# Patient Record
Sex: FEMALE | Race: WHITE | NOT HISPANIC OR LATINO | Employment: OTHER | URBAN - METROPOLITAN AREA
[De-identification: names, ages, dates, MRNs, and addresses within clinical notes are randomized per-mention and may not be internally consistent; named-entity substitution may affect disease eponyms.]

---

## 2017-11-05 ENCOUNTER — HOSPITAL ENCOUNTER (INPATIENT)
Facility: HOSPITAL | Age: 62
LOS: 2 days | Discharge: HOME | End: 2017-11-08
Attending: EMERGENCY MEDICINE | Admitting: INTERNAL MEDICINE

## 2017-11-05 DIAGNOSIS — K52.9 COLITIS: Primary | ICD-10-CM

## 2017-11-05 PROCEDURE — 99284 EMERGENCY DEPT VISIT MOD MDM: CPT

## 2017-11-05 RX ORDER — ONDANSETRON 2 MG/ML
4 INJECTION INTRAMUSCULAR; INTRAVENOUS ONCE
Status: COMPLETED | OUTPATIENT
Start: 2017-11-05 | End: 2017-11-06

## 2017-11-05 RX ORDER — CARVEDILOL 6.25 MG/1
6.25 TABLET ORAL 2 TIMES DAILY
COMMUNITY
End: 2021-11-29 | Stop reason: SDUPTHER

## 2017-11-05 RX ORDER — ASPIRIN 81 MG/1
81 TABLET, CHEWABLE ORAL DAILY
COMMUNITY

## 2017-11-05 RX ORDER — HYDROMORPHONE HYDROCHLORIDE 1 MG/ML
0.5 INJECTION, SOLUTION INTRAMUSCULAR; INTRAVENOUS; SUBCUTANEOUS ONCE
Status: COMPLETED | OUTPATIENT
Start: 2017-11-05 | End: 2017-11-06

## 2017-11-05 RX ORDER — LISINOPRIL AND HYDROCHLOROTHIAZIDE 20; 12.5 MG/1; MG/1
1 TABLET ORAL DAILY
COMMUNITY
End: 2021-11-29 | Stop reason: ALTCHOICE

## 2017-11-05 RX ORDER — CYCLOBENZAPRINE HCL 10 MG
10 TABLET ORAL 3 TIMES DAILY PRN
COMMUNITY

## 2017-11-06 ENCOUNTER — APPOINTMENT (OUTPATIENT)
Dept: CT IMAGING | Facility: HOSPITAL | Age: 62
End: 2017-11-06

## 2017-11-06 PROBLEM — K62.5 BRBPR (BRIGHT RED BLOOD PER RECTUM): Status: ACTIVE | Noted: 2017-11-06

## 2017-11-06 PROBLEM — K52.9 COLITIS: Status: ACTIVE | Noted: 2017-11-06

## 2017-11-06 PROBLEM — I10 HTN (HYPERTENSION): Status: ACTIVE | Noted: 2017-11-06

## 2017-11-06 PROBLEM — N17.9 AKI (ACUTE KIDNEY INJURY): Status: ACTIVE | Noted: 2017-11-06

## 2017-11-06 LAB
ALBUMIN SERPL-MCNC: 4.5 G/DL (ref 3.5–5.2)
ALBUMIN/GLOB SERPL: 1.7 G/DL
ALP SERPL-CCNC: 75 U/L (ref 39–117)
ALT SERPL W P-5'-P-CCNC: 21 U/L (ref 1–33)
ANION GAP SERPL CALCULATED.3IONS-SCNC: 16.2 MMOL/L
ANION GAP SERPL CALCULATED.3IONS-SCNC: 18.2 MMOL/L
AST SERPL-CCNC: 23 U/L (ref 1–32)
BACTERIA UR QL AUTO: ABNORMAL /HPF
BASOPHILS # BLD AUTO: 0.02 10*3/MM3 (ref 0–0.2)
BASOPHILS NFR BLD AUTO: 0.1 % (ref 0–1.5)
BILIRUB SERPL-MCNC: 0.6 MG/DL (ref 0.1–1.2)
BILIRUB UR QL STRIP: NEGATIVE
BUN BLD-MCNC: 25 MG/DL (ref 8–23)
BUN BLD-MCNC: 26 MG/DL (ref 8–23)
BUN/CREAT SERPL: 16.4 (ref 7–25)
BUN/CREAT SERPL: 19.4 (ref 7–25)
C DIFF TOX GENS STL QL NAA+PROBE: NEGATIVE
CALCIUM SPEC-SCNC: 8.3 MG/DL (ref 8.6–10.5)
CALCIUM SPEC-SCNC: 9.5 MG/DL (ref 8.6–10.5)
CHLORIDE SERPL-SCNC: 106 MMOL/L (ref 98–107)
CHLORIDE SERPL-SCNC: 99 MMOL/L (ref 98–107)
CLARITY UR: ABNORMAL
CO2 SERPL-SCNC: 18.8 MMOL/L (ref 22–29)
CO2 SERPL-SCNC: 23.8 MMOL/L (ref 22–29)
COLOR UR: ABNORMAL
CREAT BLD-MCNC: 1.29 MG/DL (ref 0.57–1)
CREAT BLD-MCNC: 1.59 MG/DL (ref 0.57–1)
D-LACTATE SERPL-SCNC: 1.2 MMOL/L (ref 0.5–2)
D-LACTATE SERPL-SCNC: 2.3 MMOL/L (ref 0.5–2)
D-LACTATE SERPL-SCNC: 2.4 MMOL/L (ref 0.5–2)
DEPRECATED RDW RBC AUTO: 45.5 FL (ref 37–54)
DEPRECATED RDW RBC AUTO: 46.1 FL (ref 37–54)
EOSINOPHIL # BLD AUTO: 0.01 10*3/MM3 (ref 0–0.7)
EOSINOPHIL NFR BLD AUTO: 0.1 % (ref 0.3–6.2)
ERYTHROCYTE [DISTWIDTH] IN BLOOD BY AUTOMATED COUNT: 13.2 % (ref 11.7–13)
ERYTHROCYTE [DISTWIDTH] IN BLOOD BY AUTOMATED COUNT: 13.3 % (ref 11.7–13)
GFR SERPL CREATININE-BSD FRML MDRD: 33 ML/MIN/1.73
GFR SERPL CREATININE-BSD FRML MDRD: 42 ML/MIN/1.73
GLOBULIN UR ELPH-MCNC: 2.7 GM/DL
GLUCOSE BLD-MCNC: 127 MG/DL (ref 65–99)
GLUCOSE BLD-MCNC: 188 MG/DL (ref 65–99)
GLUCOSE UR STRIP-MCNC: NEGATIVE MG/DL
GRAN CASTS URNS QL MICRO: ABNORMAL /LPF
HCT VFR BLD AUTO: 41.2 % (ref 35.6–45.5)
HCT VFR BLD AUTO: 44.4 % (ref 35.6–45.5)
HCT VFR BLD AUTO: 47.4 % (ref 35.6–45.5)
HGB BLD-MCNC: 13.3 G/DL (ref 11.9–15.5)
HGB BLD-MCNC: 14.3 G/DL (ref 11.9–15.5)
HGB BLD-MCNC: 15.8 G/DL (ref 11.9–15.5)
HGB UR QL STRIP.AUTO: NEGATIVE
HOLD SPECIMEN: NORMAL
HYALINE CASTS UR QL AUTO: ABNORMAL /LPF
IMM GRANULOCYTES # BLD: 0.05 10*3/MM3 (ref 0–0.03)
IMM GRANULOCYTES NFR BLD: 0.3 % (ref 0–0.5)
INR PPP: 1.01 (ref 0.9–1.1)
KETONES UR QL STRIP: ABNORMAL
LACTOFERRIN STL QL LA: POSITIVE
LEUKOCYTE ESTERASE UR QL STRIP.AUTO: ABNORMAL
LYMPHOCYTES # BLD AUTO: 1.66 10*3/MM3 (ref 0.9–4.8)
LYMPHOCYTES NFR BLD AUTO: 8.7 % (ref 19.6–45.3)
MCH RBC QN AUTO: 31 PG (ref 26.9–32)
MCH RBC QN AUTO: 31.3 PG (ref 26.9–32)
MCHC RBC AUTO-ENTMCNC: 32.2 G/DL (ref 32.4–36.3)
MCHC RBC AUTO-ENTMCNC: 33.3 G/DL (ref 32.4–36.3)
MCV RBC AUTO: 94 FL (ref 80.5–98.2)
MCV RBC AUTO: 96.3 FL (ref 80.5–98.2)
MONOCYTES # BLD AUTO: 1.23 10*3/MM3 (ref 0.2–1.2)
MONOCYTES NFR BLD AUTO: 6.4 % (ref 5–12)
NEUTROPHILS # BLD AUTO: 16.22 10*3/MM3 (ref 1.9–8.1)
NEUTROPHILS NFR BLD AUTO: 84.4 % (ref 42.7–76)
NITRITE UR QL STRIP: POSITIVE
PH UR STRIP.AUTO: <=5 [PH] (ref 5–8)
PLATELET # BLD AUTO: 175 10*3/MM3 (ref 140–500)
PLATELET # BLD AUTO: 268 10*3/MM3 (ref 140–500)
PMV BLD AUTO: 10 FL (ref 6–12)
PMV BLD AUTO: 10.3 FL (ref 6–12)
POTASSIUM BLD-SCNC: 3.8 MMOL/L (ref 3.5–5.2)
POTASSIUM BLD-SCNC: 3.9 MMOL/L (ref 3.5–5.2)
PROCALCITONIN SERPL-MCNC: 1.69 NG/ML (ref 0.1–0.25)
PROT SERPL-MCNC: 7.2 G/DL (ref 6–8.5)
PROT UR QL STRIP: ABNORMAL
PROTHROMBIN TIME: 12.8 SECONDS (ref 11.7–14.2)
RBC # BLD AUTO: 4.61 10*6/MM3 (ref 3.9–5.2)
RBC # BLD AUTO: 5.04 10*6/MM3 (ref 3.9–5.2)
RBC # UR: ABNORMAL /HPF
REF LAB TEST METHOD: ABNORMAL
SODIUM BLD-SCNC: 141 MMOL/L (ref 136–145)
SODIUM BLD-SCNC: 141 MMOL/L (ref 136–145)
SP GR UR STRIP: 1.02 (ref 1–1.03)
SQUAMOUS #/AREA URNS HPF: ABNORMAL /HPF
UROBILINOGEN UR QL STRIP: ABNORMAL
WBC NRBC COR # BLD: 14.21 10*3/MM3 (ref 4.5–10.7)
WBC NRBC COR # BLD: 19.19 10*3/MM3 (ref 4.5–10.7)
WBC UR QL AUTO: ABNORMAL /HPF

## 2017-11-06 PROCEDURE — 80053 COMPREHEN METABOLIC PANEL: CPT | Performed by: EMERGENCY MEDICINE

## 2017-11-06 PROCEDURE — 87046 STOOL CULTR AEROBIC BACT EA: CPT | Performed by: HOSPITALIST

## 2017-11-06 PROCEDURE — 25010000002 HYDROMORPHONE PER 4 MG: Performed by: HOSPITALIST

## 2017-11-06 PROCEDURE — 83605 ASSAY OF LACTIC ACID: CPT | Performed by: EMERGENCY MEDICINE

## 2017-11-06 PROCEDURE — 83631 LACTOFERRIN FECAL (QUANT): CPT | Performed by: INTERNAL MEDICINE

## 2017-11-06 PROCEDURE — 87045 FECES CULTURE AEROBIC BACT: CPT | Performed by: HOSPITALIST

## 2017-11-06 PROCEDURE — 74176 CT ABD & PELVIS W/O CONTRAST: CPT

## 2017-11-06 PROCEDURE — 85014 HEMATOCRIT: CPT | Performed by: INTERNAL MEDICINE

## 2017-11-06 PROCEDURE — 25010000002 HYDROMORPHONE PER 4 MG: Performed by: EMERGENCY MEDICINE

## 2017-11-06 PROCEDURE — 83605 ASSAY OF LACTIC ACID: CPT | Performed by: INTERNAL MEDICINE

## 2017-11-06 PROCEDURE — 85018 HEMOGLOBIN: CPT | Performed by: INTERNAL MEDICINE

## 2017-11-06 PROCEDURE — 25010000002 CEFTRIAXONE PER 250 MG: Performed by: EMERGENCY MEDICINE

## 2017-11-06 PROCEDURE — 81001 URINALYSIS AUTO W/SCOPE: CPT | Performed by: EMERGENCY MEDICINE

## 2017-11-06 PROCEDURE — 85027 COMPLETE CBC AUTOMATED: CPT | Performed by: HOSPITALIST

## 2017-11-06 PROCEDURE — 85610 PROTHROMBIN TIME: CPT | Performed by: EMERGENCY MEDICINE

## 2017-11-06 PROCEDURE — 87086 URINE CULTURE/COLONY COUNT: CPT | Performed by: EMERGENCY MEDICINE

## 2017-11-06 PROCEDURE — 25010000002 ONDANSETRON PER 1 MG: Performed by: EMERGENCY MEDICINE

## 2017-11-06 PROCEDURE — 25010000002 ONDANSETRON PER 1 MG: Performed by: HOSPITALIST

## 2017-11-06 PROCEDURE — 85025 COMPLETE CBC W/AUTO DIFF WBC: CPT | Performed by: EMERGENCY MEDICINE

## 2017-11-06 PROCEDURE — 84145 PROCALCITONIN (PCT): CPT | Performed by: EMERGENCY MEDICINE

## 2017-11-06 PROCEDURE — 87493 C DIFF AMPLIFIED PROBE: CPT | Performed by: INTERNAL MEDICINE

## 2017-11-06 RX ORDER — HYDROMORPHONE HYDROCHLORIDE 1 MG/ML
0.5 INJECTION, SOLUTION INTRAMUSCULAR; INTRAVENOUS; SUBCUTANEOUS
Status: DISCONTINUED | OUTPATIENT
Start: 2017-11-06 | End: 2017-11-08 | Stop reason: HOSPADM

## 2017-11-06 RX ORDER — DEXTROSE AND SODIUM CHLORIDE 5; .45 G/100ML; G/100ML
100 INJECTION, SOLUTION INTRAVENOUS CONTINUOUS
Status: DISCONTINUED | OUTPATIENT
Start: 2017-11-06 | End: 2017-11-06

## 2017-11-06 RX ORDER — SODIUM CHLORIDE 9 MG/ML
75 INJECTION, SOLUTION INTRAVENOUS CONTINUOUS
Status: DISCONTINUED | OUTPATIENT
Start: 2017-11-06 | End: 2017-11-06

## 2017-11-06 RX ORDER — CEFTRIAXONE SODIUM 1 G/50ML
1 INJECTION, SOLUTION INTRAVENOUS ONCE
Status: COMPLETED | OUTPATIENT
Start: 2017-11-06 | End: 2017-11-06

## 2017-11-06 RX ORDER — CARVEDILOL 6.25 MG/1
6.25 TABLET ORAL 2 TIMES DAILY
Status: DISCONTINUED | OUTPATIENT
Start: 2017-11-06 | End: 2017-11-08 | Stop reason: HOSPADM

## 2017-11-06 RX ORDER — ACETAMINOPHEN 325 MG/1
650 TABLET ORAL EVERY 4 HOURS PRN
Status: DISCONTINUED | OUTPATIENT
Start: 2017-11-06 | End: 2017-11-08 | Stop reason: HOSPADM

## 2017-11-06 RX ORDER — LEVOFLOXACIN 5 MG/ML
500 INJECTION, SOLUTION INTRAVENOUS EVERY 24 HOURS
Status: DISCONTINUED | OUTPATIENT
Start: 2017-11-07 | End: 2017-11-07 | Stop reason: CLARIF

## 2017-11-06 RX ORDER — DICYCLOMINE HYDROCHLORIDE 10 MG/1
10 CAPSULE ORAL 4 TIMES DAILY
Status: DISCONTINUED | OUTPATIENT
Start: 2017-11-06 | End: 2017-11-08 | Stop reason: HOSPADM

## 2017-11-06 RX ORDER — ONDANSETRON 2 MG/ML
4 INJECTION INTRAMUSCULAR; INTRAVENOUS EVERY 6 HOURS PRN
Status: DISCONTINUED | OUTPATIENT
Start: 2017-11-06 | End: 2017-11-08 | Stop reason: HOSPADM

## 2017-11-06 RX ORDER — CYCLOBENZAPRINE HCL 10 MG
10 TABLET ORAL 3 TIMES DAILY PRN
Status: DISCONTINUED | OUTPATIENT
Start: 2017-11-06 | End: 2017-11-08 | Stop reason: HOSPADM

## 2017-11-06 RX ORDER — NALOXONE HCL 0.4 MG/ML
0.4 VIAL (ML) INJECTION
Status: DISCONTINUED | OUTPATIENT
Start: 2017-11-06 | End: 2017-11-08 | Stop reason: HOSPADM

## 2017-11-06 RX ORDER — SODIUM CHLORIDE, SODIUM LACTATE, POTASSIUM CHLORIDE, CALCIUM CHLORIDE 600; 310; 30; 20 MG/100ML; MG/100ML; MG/100ML; MG/100ML
20 INJECTION, SOLUTION INTRAVENOUS CONTINUOUS
Status: DISCONTINUED | OUTPATIENT
Start: 2017-11-06 | End: 2017-11-08 | Stop reason: HOSPADM

## 2017-11-06 RX ORDER — SACCHAROMYCES BOULARDII 250 MG
250 CAPSULE ORAL 2 TIMES DAILY
Status: DISCONTINUED | OUTPATIENT
Start: 2017-11-06 | End: 2017-11-08 | Stop reason: HOSPADM

## 2017-11-06 RX ORDER — ASPIRIN 81 MG/1
81 TABLET, CHEWABLE ORAL DAILY
Status: DISCONTINUED | OUTPATIENT
Start: 2017-11-06 | End: 2017-11-06

## 2017-11-06 RX ORDER — NITROGLYCERIN 0.4 MG/1
0.4 TABLET SUBLINGUAL
Status: DISCONTINUED | OUTPATIENT
Start: 2017-11-06 | End: 2017-11-08 | Stop reason: HOSPADM

## 2017-11-06 RX ORDER — HYDROMORPHONE HYDROCHLORIDE 1 MG/ML
0.5 INJECTION, SOLUTION INTRAMUSCULAR; INTRAVENOUS; SUBCUTANEOUS ONCE
Status: COMPLETED | OUTPATIENT
Start: 2017-11-06 | End: 2017-11-06

## 2017-11-06 RX ADMIN — SODIUM CHLORIDE 1000 ML: 9 INJECTION, SOLUTION INTRAVENOUS at 00:09

## 2017-11-06 RX ADMIN — CARVEDILOL 6.25 MG: 6.25 TABLET, FILM COATED ORAL at 18:11

## 2017-11-06 RX ADMIN — DEXTROSE AND SODIUM CHLORIDE 100 ML/HR: 5; 450 INJECTION, SOLUTION INTRAVENOUS at 05:07

## 2017-11-06 RX ADMIN — DICYCLOMINE HYDROCHLORIDE 10 MG: 10 CAPSULE ORAL at 13:38

## 2017-11-06 RX ADMIN — CARVEDILOL 6.25 MG: 6.25 TABLET, FILM COATED ORAL at 08:58

## 2017-11-06 RX ADMIN — ACETAMINOPHEN 650 MG: 325 TABLET ORAL at 23:12

## 2017-11-06 RX ADMIN — HYDROMORPHONE HYDROCHLORIDE 0.5 MG: 1 INJECTION, SOLUTION INTRAMUSCULAR; INTRAVENOUS; SUBCUTANEOUS at 05:07

## 2017-11-06 RX ADMIN — CEFTRIAXONE SODIUM 1 G: 1 INJECTION, SOLUTION INTRAVENOUS at 01:15

## 2017-11-06 RX ADMIN — CYCLOBENZAPRINE HYDROCHLORIDE 10 MG: 10 TABLET, FILM COATED ORAL at 18:18

## 2017-11-06 RX ADMIN — Medication 250 MG: at 12:54

## 2017-11-06 RX ADMIN — DICYCLOMINE HYDROCHLORIDE 10 MG: 10 CAPSULE ORAL at 18:11

## 2017-11-06 RX ADMIN — HYDROMORPHONE HYDROCHLORIDE 0.5 MG: 1 INJECTION, SOLUTION INTRAMUSCULAR; INTRAVENOUS; SUBCUTANEOUS at 00:09

## 2017-11-06 RX ADMIN — Medication 250 MG: at 18:11

## 2017-11-06 RX ADMIN — HYDROMORPHONE HYDROCHLORIDE 0.5 MG: 1 INJECTION, SOLUTION INTRAMUSCULAR; INTRAVENOUS; SUBCUTANEOUS at 01:53

## 2017-11-06 RX ADMIN — HYDROMORPHONE HYDROCHLORIDE 0.5 MG: 1 INJECTION, SOLUTION INTRAMUSCULAR; INTRAVENOUS; SUBCUTANEOUS at 08:58

## 2017-11-06 RX ADMIN — METRONIDAZOLE 500 MG: 500 INJECTION, SOLUTION INTRAVENOUS at 18:11

## 2017-11-06 RX ADMIN — HYDROMORPHONE HYDROCHLORIDE 0.5 MG: 1 INJECTION, SOLUTION INTRAMUSCULAR; INTRAVENOUS; SUBCUTANEOUS at 10:59

## 2017-11-06 RX ADMIN — SODIUM CHLORIDE, POTASSIUM CHLORIDE, SODIUM LACTATE AND CALCIUM CHLORIDE 1000 ML: 600; 310; 30; 20 INJECTION, SOLUTION INTRAVENOUS at 08:40

## 2017-11-06 RX ADMIN — METRONIDAZOLE 500 MG: 500 INJECTION, SOLUTION INTRAVENOUS at 10:45

## 2017-11-06 RX ADMIN — SODIUM CHLORIDE 2259 ML: 9 INJECTION, SOLUTION INTRAVENOUS at 01:18

## 2017-11-06 RX ADMIN — ONDANSETRON 4 MG: 2 INJECTION INTRAMUSCULAR; INTRAVENOUS at 00:09

## 2017-11-06 RX ADMIN — ONDANSETRON 4 MG: 2 INJECTION INTRAMUSCULAR; INTRAVENOUS at 10:59

## 2017-11-06 RX ADMIN — DICYCLOMINE HYDROCHLORIDE 10 MG: 10 CAPSULE ORAL at 10:58

## 2017-11-06 RX ADMIN — SODIUM CHLORIDE, POTASSIUM CHLORIDE, SODIUM LACTATE AND CALCIUM CHLORIDE 100 ML/HR: 600; 310; 30; 20 INJECTION, SOLUTION INTRAVENOUS at 08:40

## 2017-11-06 RX ADMIN — METRONIDAZOLE 500 MG: 500 INJECTION, SOLUTION INTRAVENOUS at 02:03

## 2017-11-06 RX ADMIN — SODIUM CHLORIDE, POTASSIUM CHLORIDE, SODIUM LACTATE AND CALCIUM CHLORIDE 100 ML/HR: 600; 310; 30; 20 INJECTION, SOLUTION INTRAVENOUS at 22:00

## 2017-11-06 RX ADMIN — SODIUM CHLORIDE 75 ML/HR: 9 INJECTION, SOLUTION INTRAVENOUS at 04:35

## 2017-11-06 RX ADMIN — CYCLOBENZAPRINE HYDROCHLORIDE 10 MG: 10 TABLET, FILM COATED ORAL at 05:07

## 2017-11-06 NOTE — CONSULTS
Inpatient Consult to Gastroenterology  Consult performed by: THOMPSON CEBALLOS  Consult ordered by: JONATHAN ROSSI  Reason for consult: diarrhea, bleeding, colitis          Patient Care Team:  Farzaneh Ball MD as PCP - General (Internal Medicine)    Chief complaint: nausea, vomiting, diarrhea, blood in stool    Subjective   Pt is a 62 y.o. female who presents due to an episode of bloody diarrhea that started last night. Pt also complains of abdominal pain that she rates as an 8/10 in severity, nausea, and vomiting. The abdominal pain, diarrhea, and rectal bleeding onset simultaneously. Has had cramping in the past but states it has never been this bad. Had a colonoscopy done at [facility] when she was 48 y.o. Believes she did have polyps that were removed at that time but unsure if diverticulosis was noted. Patient is also positive for UTI.         .Abdominal Pain   This is a new problem. The current episode started yesterday. The onset quality is sudden. The pain is moderate. The quality of the pain is cramping. The abdominal pain radiates to the LLQ and RLQ. Associated symptoms include diarrhea, hematochezia, hematuria, nausea and vomiting.   Rectal Bleeding   Associated symptoms include abdominal pain, nausea and vomiting.       Review of Systems   Gastrointestinal: Positive for abdominal pain, blood in stool, diarrhea, hematochezia, nausea and vomiting.   Genitourinary: Positive for hematuria.        Past Medical History:   Diagnosis Date   • Coronary artery disease    • Depression    • Hyperlipidemia    • Hypertension    • Myocardial infarction    ,   Past Surgical History:   Procedure Laterality Date   • HYSTERECTOMY     • NEPHRECTOMY Left    , History reviewed. No pertinent family history.,   Social History   Substance Use Topics   • Smoking status: Current Every Day Smoker     Packs/day: 0.50     Types: Cigarettes   • Smokeless tobacco: None   • Alcohol use No   ,   Prescriptions Prior to  Admission   Medication Sig Dispense Refill Last Dose   • aspirin 81 MG chewable tablet Chew 81 mg Daily.   11/5/2017 at Unknown time   • Carvedilol (COREG PO) Take 6.25 mg by mouth 2 (Two) Times a Day.   11/5/2017 at Unknown time   • cyclobenzaprine (FLEXERIL) 10 MG tablet Take 10 mg by mouth 3 (Three) Times a Day As Needed for Muscle Spasms.   Past Week at Unknown time   • LISINOPRIL PO Take  by mouth Daily.   11/5/2017 at Unknown time   • lisinopril-hydrochlorothiazide (PRINZIDE,ZESTORETIC) 20-12.5 MG per tablet Take 1 tablet by mouth Daily.   11/5/2017 at Unknown time   , Scheduled Meds:    carvedilol 6.25 mg Oral BID   dicyclomine 10 mg Oral 4x Daily   [START ON 11/7/2017] levoFLOXacin 500 mg Intravenous Q24H   metroNIDAZOLE 500 mg Intravenous Q8H   saccharomyces boulardii 250 mg Oral BID   , Continuous Infusions:    lactated ringers 100 mL/hr Last Rate: 100 mL/hr (11/06/17 0840)   , PRN Meds:  cyclobenzaprine  •  HYDROmorphone **AND** naloxone  •  nitroglycerin  •  ondansetron and Allergies:  Penicillins    Objective      Vital Signs  Temp:  [97.6 °F (36.4 °C)-99 °F (37.2 °C)] 98.7 °F (37.1 °C)  Heart Rate:  [] 101  Resp:  [15-18] 16  BP: (142-157)/() 149/90    Physical Exam   Constitutional: She is oriented to person, place, and time. She appears well-developed and well-nourished.   Cardiovascular: Regular rhythm and normal heart sounds.  Exam reveals no gallop and no friction rub.    No murmur heard.  tachycardia   Pulmonary/Chest: Effort normal and breath sounds normal. She has no wheezes. She has no rales.   Abdominal: Soft. Bowel sounds are normal. She exhibits no distension and no mass. There is tenderness. There is no rebound and no guarding. No hernia.   Neurological: She is alert and oriented to person, place, and time.   Skin: Skin is warm and dry.   Psychiatric: She has a normal mood and affect. Her behavior is normal. Judgment and thought content normal.       Results Review:    I  reviewed the patient's new clinical results.        Assessment/Plan     Principal Problem:    Colitis  Active Problems:    BRBPR (bright red blood per rectum)    HTN (hypertension)    HANSA (acute kidney injury)      Assessment:  (Acute Colitis).     Plan:   (After discussing with patient and Dr. Miller do not feel it is necessary to perform colonoscopy at this time. Once discharged and patient is feeling better will do a scope then, unless symptoms become worse. Patient will be placed on a full liquid diet. Ordered Florastore. Awaiting stool culture results. Continue current plain of care. ).       I discussed the patients findings and my recommendations with patient and nursing staff    Jing Patel, APRN  11/06/17  10:44 AM    Time: Critical care 15 min

## 2017-11-06 NOTE — H&P
Patient Identification:  Name: Alicia Ag  Age/Sex: 62 y.o. female  :  1955  MRN: [id number]         Primary Care Physician: Farzaneh Ball MD    Chief Complaint   Patient presents with   • Abdominal Pain     onset this afternoon   • Rectal Bleeding     onset 30 min PTA       Subjective   Patient is a 62 y.o. female with a h/o HTN, CAD who presents with several hour history of abdominal cramping and bloody diarrhea. She states it started yesterday around 4p with diffuse abdominal cramping. She then had diarrhea. She said the first 2-3 episodes were brown but then they turned to cathy blood. Had some nausea and felt like throwing up but never did. She denies eating anything unusual or undercooked prior to this. She denies any sick contacts or recent abx use. She had some chills but no fevers that she knows of. Nothing made it better or worse. Never had anything liek this before. Has had multiple episodes of bloody diarrhea since this started.     History of Present Illness    Past Medical History:   Diagnosis Date   • Coronary artery disease    • Depression    • Hyperlipidemia    • Hypertension    • Myocardial infarction      Past Surgical History:   Procedure Laterality Date   • HYSTERECTOMY     • NEPHRECTOMY Left      History reviewed. No pertinent family history.  Social History   Substance Use Topics   • Smoking status: Current Every Day Smoker     Packs/day: 0.50     Types: Cigarettes   • Smokeless tobacco: None   • Alcohol use No     Prescriptions Prior to Admission   Medication Sig Dispense Refill Last Dose   • aspirin 81 MG chewable tablet Chew 81 mg Daily.   2017 at Unknown time   • Carvedilol (COREG PO) Take 6.25 mg by mouth 2 (Two) Times a Day.   2017 at Unknown time   • cyclobenzaprine (FLEXERIL) 10 MG tablet Take 10 mg by mouth 3 (Three) Times a Day As Needed for Muscle Spasms.   Past Week at Unknown time   • LISINOPRIL PO Take  by mouth Daily.   2017 at Unknown time   •  lisinopril-hydrochlorothiazide (PRINZIDE,ZESTORETIC) 20-12.5 MG per tablet Take 1 tablet by mouth Daily.   11/5/2017 at Unknown time     Allergies:  Penicillins    Review of Systems   Constitutional: Positive for chills. Negative for fever.   HENT: Negative.    Eyes: Negative.    Respiratory: Negative.    Cardiovascular: Negative.    Gastrointestinal: Positive for abdominal pain, blood in stool, diarrhea and nausea. Negative for vomiting.   Endocrine: Negative.    Genitourinary: Negative.    Musculoskeletal: Negative.    Skin: Negative.    Neurological: Negative.    Hematological: Negative.    Psychiatric/Behavioral: Negative.         Objective    Vital Signs  Temp:  [97.6 °F (36.4 °C)-99 °F (37.2 °C)] 98.7 °F (37.1 °C)  Heart Rate:  [] 110  Resp:  [15-18] 16  BP: (142-157)/() 149/90  Body mass index is 29.72 kg/(m^2).    Physical Exam   Constitutional: She is oriented to person, place, and time. She appears well-developed and well-nourished.   HENT:   Head: Normocephalic and atraumatic.   Mouth/Throat: No oropharyngeal exudate.   Eyes: Conjunctivae are normal. No scleral icterus.   Neck: Normal range of motion. No JVD present. No tracheal deviation present.   Cardiovascular: Regular rhythm.    No murmur heard.  tachy   Pulmonary/Chest: Effort normal and breath sounds normal.   Abdominal: Soft. Bowel sounds are normal. She exhibits no distension. There is tenderness. There is no rebound and no guarding.   Diffuse ttp   Musculoskeletal: She exhibits no edema or deformity.   Neurological: She is alert and oriented to person, place, and time.   Skin: Skin is warm and dry.   Psychiatric: She has a normal mood and affect. Her behavior is normal. Judgment and thought content normal.       Results Review:   I reviewed the patient's new clinical results.  Imaging Results (last 24 hours)     Procedure Component Value Units Date/Time    CT Abdomen Pelvis Without Contrast [[id number]] Collected:  11/06/17 0123      Updated:  11/06/17 0123    Narrative:       CT ABDOMEN AND PELVIS WITHOUT CONTRAST.     TECHNIQUE: Radiation dose reduction techniques were utilized, including  automated exposure control and exposure modulation based on body size. A  routine CT scan of the abdomen and pelvis was performed with coronal and  sagittal reconstructed images.     HISTORY: Left lower quadrant pain, rectal bleeding.     COMPARISON: No prior studies for comparison.     FINDINGS:  Lung bases demonstrate no consolidation or effusion.          Liver demonstrates homogeneous parenchyma, no definite mass. There are  multiple hepatic cysts, largest measures 3.4 cm. Unremarkable  gallbladder. No intra or extrahepatic biliary ductal dilatation.      The spleen is unremarkable. Pancreas is unremarkable, no pancreatic  ductal dilatation. Adrenal glands are within normal limits.     Kidneys do not demonstrate hydronephrosis. No definite renal calculi.  Urinary bladder is unremarkable.         There are mild inflammatory changes of the ascending and transverse  colon, more severe changes are seen in the descending and sigmoid colon,  the splenic flexure of the colon, descending and sigmoid colon is also  filled with fluid. Inflammatory changes are seen in the left paracolic  gutter and in the pelvis. Appendix is not visualized.     No significant retroperitoneal lymphadenopathy.              Impression:       Moderately severe colitis of the splenic flexure of the colon,  descending and sigmoid colon. Mild colitis of the ascending and  transverse colon. No obstruction, perforation or abscess. Differential  diagnoses includes infectious, inflammatory changes or ischemia in the  right clinical setting. Please clinically correlate.                      Assessment/Plan     Principal Problem:    Colitis  Active Problems:    BRBPR (bright red blood per rectum)    HTN (hypertension)      Assessment & Plan  1. Colitis with BRBPR  - sepsis present on  admission  - infectious vs inflammatory vs ischemic  - cont falgyl and levaquin  - stool culture, CDiff, fecal lactoferrin  - GI consult as she will need cscope at some point  - bolus another 1L LR and switch fluid to LR at 100cc/hr  - recheck lactate and H/H  - recheck procalcitonin in am  - add bentyl for abd cramping  - NPO until GI sees her    2. HANSA  - improving with IVF  - recheck in am    3. HTN  - restarted coreg  - holding lisinopril-HCTZ currently      I discussed the patients findings and my recommendations with patient.          Leeroy Hanson MD  [facility]  11/06/17  8:20 AM

## 2017-11-06 NOTE — PLAN OF CARE
Problem: Patient Care Overview (Adult)  Goal: Plan of Care Review  Outcome: Ongoing (interventions implemented as appropriate)    11/06/17 1702   Coping/Psychosocial Response Interventions   Plan Of Care Reviewed With patient;daughter   Patient Care Overview   Progress improving   Outcome Evaluation   Outcome Summary/Follow up Plan Pain control ongoing, tollorating PO, ABX ongoing       Goal: Adult Individualization and Mutuality  Outcome: Ongoing (interventions implemented as appropriate)  Goal: Discharge Needs Assessment  Outcome: Ongoing (interventions implemented as appropriate)    Problem: Sepsis (Adult)  Goal: Signs and Symptoms of Listed Potential Problems Will be Absent or Manageable (Sepsis)  Outcome: Ongoing (interventions implemented as appropriate)

## 2017-11-06 NOTE — ED TRIAGE NOTES
Pt c/o lower abdominal cramping, n/v and rectal bleeding. Cramping started this afternoon, rectal bleeding started 30 min PTA

## 2017-11-06 NOTE — ED PROVIDER NOTES
EMERGENCY DEPARTMENT ENCOUNTER    CHIEF COMPLAINT  Chief Complaint: rectal bleeding   History given by: patient  History limited by: nothing   Room Number: 32/32  PMD: Farzaneh Ball MD      HPI:  Pt is a 62 y.o. female who presents due to an episode of bloody diarrhea that occurred PTA. Pt also complains of abdominal pain that she rates as an 8/10 in severity, nausea, and vomiting. The abdominal pain and rectal bleeding onset simultaneously. Pt describes the blood as bright red. Pt denies h/o diverticulosis.     Onset: 30 minutes PTA  Timing: episode   Location: abdomen  Radiation: does not radiate   Quality: bright red blood  Intensity/Severity: moderate   Progression: unchanged   Associated Symptoms: nausea, vomiting, abdominal pain  Aggravating Factors: none specified   Alleviating Factors: none specified  Previous Episodes: none specified   Treatment before arrival: none specified     PAST MEDICAL HISTORY  Active Ambulatory Problems     Diagnosis Date Noted   • No Active Ambulatory Problems     Resolved Ambulatory Problems     Diagnosis Date Noted   • No Resolved Ambulatory Problems     Past Medical History:   Diagnosis Date   • Coronary artery disease    • Depression    • Hyperlipidemia    • Hypertension    • Myocardial infarction        PAST SURGICAL HISTORY  Past Surgical History:   Procedure Laterality Date   • HYSTERECTOMY     • NEPHRECTOMY Left        FAMILY HISTORY  History reviewed. No pertinent family history.    SOCIAL HISTORY  Social History     Social History   • Marital status:      Spouse name: N/A   • Number of children: N/A   • Years of education: N/A     Occupational History   • Not on file.     Social History Main Topics   • Smoking status: Current Every Day Smoker     Packs/day: 0.50     Types: Cigarettes   • Smokeless tobacco: Not on file   • Alcohol use No   • Drug use: No   • Sexual activity: Defer     Other Topics Concern   • Not on file     Social History Narrative   • No  narrative on file       ALLERGIES  Penicillins    REVIEW OF SYSTEMS  Review of Systems   Constitutional: Negative for fever.   Respiratory: Negative for shortness of breath.    Cardiovascular: Negative for chest pain.   Gastrointestinal: Positive for abdominal pain, blood in stool, diarrhea, nausea and vomiting.       PHYSICAL EXAM  ED Triage Vitals   Temp Heart Rate Resp BP SpO2   11/05/17 2243 11/05/17 2243 11/05/17 2243 11/05/17 2246 11/05/17 2243   97.6 °F (36.4 °C) 103 18 154/109 98 %      Temp src Heart Rate Source Patient Position BP Location FiO2 (%)   -- -- -- -- --              Physical Exam   Constitutional: She is oriented to person, place, and time and well-developed, well-nourished, and in no distress.   HENT:   Head: Normocephalic and atraumatic.   Eyes: EOM are normal. Pupils are equal, round, and reactive to light.   Neck: Normal range of motion. Neck supple.   Cardiovascular: Normal rate, regular rhythm and normal heart sounds.    Pulmonary/Chest: Effort normal and breath sounds normal. No respiratory distress.   Abdominal: Soft. There is tenderness (severe) in the left lower quadrant. There is guarding (voluntary). There is no rebound.   Musculoskeletal: Normal range of motion. She exhibits no edema.   Neurological: She is alert and oriented to person, place, and time. She has normal sensation and normal strength.   Skin: Skin is warm and dry. No rash noted.   Psychiatric: Mood and affect normal.   Nursing note and vitals reviewed.      LAB RESULTS  Lab Results (last 24 hours)     Procedure Component Value Units Date/Time    CBC & Differential [[id number]] Collected:  11/06/17 0008    Specimen:  Blood Updated:  11/06/17 0041    Narrative:       The following orders were created for panel order CBC & Differential.  Procedure                               Abnormality         Status                     ---------                               -----------         ------                     CBC Auto  "Differential[[id number]]        Abnormal            Final result                 Please view results for these tests on the individual orders.    Comprehensive Metabolic Panel [[id number]]  (Abnormal) Collected:  11/06/17 0008    Specimen:  Blood Updated:  11/06/17 0054     Glucose 188 (H) mg/dL      BUN 26 (H) mg/dL      Creatinine 1.59 (H) mg/dL      Sodium 141 mmol/L      Potassium 3.8 mmol/L      Chloride 99 mmol/L      CO2 23.8 mmol/L      Calcium 9.5 mg/dL      Total Protein 7.2 g/dL      Albumin 4.50 g/dL      ALT (SGPT) 21 U/L      AST (SGOT) 23 U/L      Alkaline Phosphatase 75 U/L      Total Bilirubin 0.6 mg/dL      eGFR Non African Amer 33 (L) mL/min/1.73      Globulin 2.7 gm/dL      A/G Ratio 1.7 g/dL      BUN/Creatinine Ratio 16.4     Anion Gap 18.2 mmol/L     Protime-INR [[id number]]  (Normal) Collected:  11/06/17 0008    Specimen:  Blood Updated:  11/06/17 0052     Protime 12.8 Seconds      INR 1.01    Lactic Acid, Plasma [[id number]]  (Abnormal) Collected:  11/06/17 0008    Specimen:  Blood Updated:  11/06/17 0056     Lactate 2.4 (C) mmol/L     Procalcitonin [[id number]]  (Abnormal) Collected:  11/06/17 0008    Specimen:  Blood Updated:  11/06/17 0110     Procalcitonin 1.69 (C) ng/mL     Narrative:       As a Marker for Sepsis (Non-Neonates):   1. <0.5 ng/mL represents a low risk of severe sepsis and/or septic shock.  1. >2 ng/mL represents a high risk of severe sepsis and/or septic shock.    As a Marker for Lower Respiratory Tract Infections that require antibiotic therapy:  PCT on Admission     Antibiotic Therapy             6-12 Hrs later  > 0.5                Strongly Recommended            >0.25 - <0.5         Recommended  0.1 - 0.25           Discouraged                   Remeasure/reassess PCT  <0.1                 Strongly Discouraged          Remeasure/reassess PCT      As 28 day mortality risk marker: \"Change in Procalcitonin Result\" (> 80 % or <=80 %) if Day 0 (or Day 1) and Day 4 values are " available. Refer to http://www.[facility]-pct-calculator.com/   Change in PCT <=80 %   A decrease of PCT levels below or equal to 80 % defines a positive change in PCT test result representing a higher risk for 28-day all-cause mortality of patients diagnosed with severe sepsis or septic shock.  Change in PCT > 80 %   A decrease of PCT levels of more than 80 % defines a negative change in PCT result representing a lower risk for 28-day all-cause mortality of patients diagnosed with severe sepsis or septic shock.                CBC Auto Differential [[id number]]  (Abnormal) Collected:  11/06/17 0008    Specimen:  Blood Updated:  11/06/17 0041     WBC 19.19 (H) 10*3/mm3      RBC 5.04 10*6/mm3      Hemoglobin 15.8 (H) g/dL      Hematocrit 47.4 (H) %      MCV 94.0 fL      MCH 31.3 pg      MCHC 33.3 g/dL      RDW 13.3 (H) %      RDW-SD 45.5 fl      MPV 10.3 fL      Platelets 268 10*3/mm3      Neutrophil % 84.4 (H) %      Lymphocyte % 8.7 (L) %      Monocyte % 6.4 %      Eosinophil % 0.1 (L) %      Basophil % 0.1 %      Immature Grans % 0.3 %      Neutrophils, Absolute 16.22 (H) 10*3/mm3      Lymphocytes, Absolute 1.66 10*3/mm3      Monocytes, Absolute 1.23 (H) 10*3/mm3      Eosinophils, Absolute 0.01 10*3/mm3      Basophils, Absolute 0.02 10*3/mm3      Immature Grans, Absolute 0.05 (H) 10*3/mm3     Lactic Acid, Reflex Timer [[id number]] Collected:  11/06/17 0008    Specimen:  Blood Updated:  11/06/17 0056    Urinalysis With / Culture If Indicated - Urine, Catheter [[id number]]  (Abnormal) Collected:  11/06/17 0017    Specimen:  Urine from Urine, Catheter Updated:  11/06/17 0043     Color, UA Violeta (A)     Appearance, UA Cloudy (A)     pH, UA <=5.0     Specific Gravity, UA 1.023     Glucose, UA Negative     Ketones, UA 15 mg/dL (1+) (A)     Bilirubin, UA Negative     Blood, UA Negative     Protein, UA 30 mg/dL (1+) (A)     Leuk Esterase, UA Moderate (2+) (A)     Nitrite, UA Positive (A)     Urobilinogen, UA 2.0 E.U./dL (A)     Urinalysis, Microscopic Only - Urine, Clean Catch [[id number]]  (Abnormal) Collected:  11/06/17 0017    Specimen:  Urine from Urine, Catheter Updated:  11/06/17 0056     RBC, UA None Seen /HPF      WBC, UA 3-5 (A) /HPF      Bacteria, UA 2+ (A) /HPF      Squamous Epithelial Cells, UA 3-6 (A) /HPF      Hyaline Casts, UA 7-12 /LPF      Granular Casts, UA 0-2 /LPF      Methodology Manual Light Microscopy    Urine Culture - Urine, Urine, Clean Catch [[id number]] Collected:  11/06/17 0017    Specimen:  Urine from Urine, Catheter Updated:  11/06/17 0036          I ordered the above labs and reviewed the results    RADIOLOGY  CT Abdomen Pelvis Without Contrast   Preliminary Result   Moderately severe colitis of the splenic flexure of the colon,   descending and sigmoid colon. Mild colitis of the ascending and   transverse colon. No obstruction, perforation or abscess. Differential   diagnoses includes infectious, inflammatory changes or ischemia in the   right clinical setting. Please clinically correlate.                          I ordered the above noted radiological studies. Interpreted by radiologist. Reviewed by me in PACS.       PROCEDURES  Procedures      PROGRESS AND CONSULTS  ED Course     2349: Ordered CT abd/pelvis and labs for further evaluation. Also ordered 0.5 mg Dilaudid/zofran for pain and 1,000 cc fluid bolus for hydration.     0048: Ordered IV Rocephin for treatment.     0135: Ordered IV Flagyl for treatment. Placed call to A for admission.     0147: Ordered 0.5 mg Dilaudid for pain.     0150: Discussed pt's case with Dr. Fan (internal medicine), who agrees to admit pt.     0207: Rechecked pt. Pt still complains of pain. Discussed diagnosis of colitis and plan to admit for further evaluation ad treatment. Pt understand and agrees with plan, and all questions were addressed.     MEDICAL DECISION MAKING  Results were reviewed/discussed with the patient and they were also made aware of online  access. Pt also made aware that some labs, such as cultures, will not be resulted during ER visit and follow up with PMD is necessary.     MDM  Number of Diagnoses or Management Options     Amount and/or Complexity of Data Reviewed  Clinical lab tests: ordered and reviewed (Procalcitonin: 1.69  Lactic acid: 2.4)  Tests in the radiology section of CPT®: ordered and reviewed (CT abd/pelvis shows colitis. )  Discuss the patient with other providers: yes    Patient Progress  Patient progress: stable         DIAGNOSIS  Final diagnoses:   Colitis       DISPOSITION  ADMISSION    Discussed treatment plan and reason for admission with pt/family and admitting physician.  Pt/family voiced understanding of the plan for admission for further testing/treatment as needed.         Latest Documented Vital Signs:  As of 2:09 AM  BP- 143/87 HR- 96 Temp- 97.6 °F (36.4 °C) O2 sat- 93%    --  Documentation assistance provided by allen Herrera for Reggie Arroyo.  Information recorded by the scribe was done at my direction and has been verified and validated by me.              Emily Herrera  11/06/17 0209       Reggie Arroyo MD  11/06/17 0258

## 2017-11-06 NOTE — PLAN OF CARE
Problem: Patient Care Overview (Adult)  Goal: Plan of Care Review  Outcome: Ongoing (interventions implemented as appropriate)    11/06/17 0519   Coping/Psychosocial Response Interventions   Plan Of Care Reviewed With patient   Patient Care Overview   Progress improving   Outcome Evaluation   Outcome Summary/Follow up Plan Pt arrived to floor around 0320 and had received IV rocephin in ED. Levaquin is scheduled for midnight tomorrow d/t rocephin dosing. Pt is currently NPO. VSS other than HR. Pt is slightly tachycardic in the 100-110's. Pt is on room air at 95% O2sat. Awaiting BM for stool culture. MD aware of elevated lab results.        Goal: Adult Individualization and Mutuality  Outcome: Ongoing (interventions implemented as appropriate)  Goal: Discharge Needs Assessment  Outcome: Ongoing (interventions implemented as appropriate)    Problem: Sepsis (Adult)  Goal: Signs and Symptoms of Listed Potential Problems Will be Absent or Manageable (Sepsis)  Outcome: Ongoing (interventions implemented as appropriate)  Elevated WBC 19.19. Elevated Lactic and Procalcitonin

## 2017-11-07 LAB
ANION GAP SERPL CALCULATED.3IONS-SCNC: 9.9 MMOL/L
BACTERIA SPEC AEROBE CULT: NO GROWTH
BASOPHILS # BLD AUTO: 0.01 10*3/MM3 (ref 0–0.2)
BASOPHILS NFR BLD AUTO: 0.1 % (ref 0–1.5)
BUN BLD-MCNC: 8 MG/DL (ref 8–23)
BUN/CREAT SERPL: 10.1 (ref 7–25)
CALCIUM SPEC-SCNC: 8.4 MG/DL (ref 8.6–10.5)
CHLORIDE SERPL-SCNC: 107 MMOL/L (ref 98–107)
CO2 SERPL-SCNC: 26.1 MMOL/L (ref 22–29)
CREAT BLD-MCNC: 0.79 MG/DL (ref 0.57–1)
DEPRECATED RDW RBC AUTO: 47.1 FL (ref 37–54)
EOSINOPHIL # BLD AUTO: 0.05 10*3/MM3 (ref 0–0.7)
EOSINOPHIL NFR BLD AUTO: 0.5 % (ref 0.3–6.2)
ERYTHROCYTE [DISTWIDTH] IN BLOOD BY AUTOMATED COUNT: 13.5 % (ref 11.7–13)
GFR SERPL CREATININE-BSD FRML MDRD: 73 ML/MIN/1.73
GLUCOSE BLD-MCNC: 92 MG/DL (ref 65–99)
HCT VFR BLD AUTO: 36.1 % (ref 35.6–45.5)
HGB BLD-MCNC: 11.8 G/DL (ref 11.9–15.5)
IMM GRANULOCYTES # BLD: 0 10*3/MM3 (ref 0–0.03)
IMM GRANULOCYTES NFR BLD: 0 % (ref 0–0.5)
LYMPHOCYTES # BLD AUTO: 2.05 10*3/MM3 (ref 0.9–4.8)
LYMPHOCYTES NFR BLD AUTO: 22.2 % (ref 19.6–45.3)
MCH RBC QN AUTO: 31.5 PG (ref 26.9–32)
MCHC RBC AUTO-ENTMCNC: 32.7 G/DL (ref 32.4–36.3)
MCV RBC AUTO: 96.3 FL (ref 80.5–98.2)
MONOCYTES # BLD AUTO: 0.45 10*3/MM3 (ref 0.2–1.2)
MONOCYTES NFR BLD AUTO: 4.9 % (ref 5–12)
NEUTROPHILS # BLD AUTO: 6.66 10*3/MM3 (ref 1.9–8.1)
NEUTROPHILS NFR BLD AUTO: 72.3 % (ref 42.7–76)
PLATELET # BLD AUTO: 172 10*3/MM3 (ref 140–500)
PMV BLD AUTO: 9.9 FL (ref 6–12)
POTASSIUM BLD-SCNC: 3.7 MMOL/L (ref 3.5–5.2)
PROCALCITONIN SERPL-MCNC: 2.13 NG/ML (ref 0.1–0.25)
RBC # BLD AUTO: 3.75 10*6/MM3 (ref 3.9–5.2)
SODIUM BLD-SCNC: 143 MMOL/L (ref 136–145)
WBC NRBC COR # BLD: 9.22 10*3/MM3 (ref 4.5–10.7)

## 2017-11-07 PROCEDURE — 80048 BASIC METABOLIC PNL TOTAL CA: CPT | Performed by: INTERNAL MEDICINE

## 2017-11-07 PROCEDURE — 84145 PROCALCITONIN (PCT): CPT | Performed by: INTERNAL MEDICINE

## 2017-11-07 PROCEDURE — 25010000002 LEVOFLOXACIN PER 250 MG: Performed by: HOSPITALIST

## 2017-11-07 PROCEDURE — 85025 COMPLETE CBC W/AUTO DIFF WBC: CPT | Performed by: INTERNAL MEDICINE

## 2017-11-07 RX ORDER — ACETAMINOPHEN 325 MG/1
TABLET ORAL
Status: DISPENSED
Start: 2017-11-07 | End: 2017-11-07

## 2017-11-07 RX ORDER — LISINOPRIL AND HYDROCHLOROTHIAZIDE 20; 12.5 MG/1; MG/1
1 TABLET ORAL DAILY
Status: DISCONTINUED | OUTPATIENT
Start: 2017-11-07 | End: 2017-11-08 | Stop reason: HOSPADM

## 2017-11-07 RX ORDER — METRONIDAZOLE 500 MG/1
500 TABLET ORAL EVERY 8 HOURS SCHEDULED
Status: DISCONTINUED | OUTPATIENT
Start: 2017-11-07 | End: 2017-11-08 | Stop reason: HOSPADM

## 2017-11-07 RX ORDER — LEVOFLOXACIN 500 MG/1
500 TABLET, FILM COATED ORAL EVERY 24 HOURS
Status: DISCONTINUED | OUTPATIENT
Start: 2017-11-07 | End: 2017-11-08 | Stop reason: HOSPADM

## 2017-11-07 RX ADMIN — METRONIDAZOLE 500 MG: 500 TABLET, FILM COATED ORAL at 22:14

## 2017-11-07 RX ADMIN — ACETAMINOPHEN 650 MG: 325 TABLET ORAL at 11:42

## 2017-11-07 RX ADMIN — LEVOFLOXACIN 500 MG: 500 TABLET, FILM COATED ORAL at 23:31

## 2017-11-07 RX ADMIN — METRONIDAZOLE 500 MG: 500 INJECTION, SOLUTION INTRAVENOUS at 09:26

## 2017-11-07 RX ADMIN — METRONIDAZOLE 500 MG: 500 INJECTION, SOLUTION INTRAVENOUS at 02:37

## 2017-11-07 RX ADMIN — CYCLOBENZAPRINE HYDROCHLORIDE 10 MG: 10 TABLET, FILM COATED ORAL at 22:50

## 2017-11-07 RX ADMIN — DICYCLOMINE HYDROCHLORIDE 10 MG: 10 CAPSULE ORAL at 20:10

## 2017-11-07 RX ADMIN — Medication 250 MG: at 09:25

## 2017-11-07 RX ADMIN — ACETAMINOPHEN 650 MG: 325 TABLET ORAL at 20:11

## 2017-11-07 RX ADMIN — LISINOPRIL AND HYDROCHLOROTHIAZIDE 1 TABLET: 20; 12.5 TABLET ORAL at 11:44

## 2017-11-07 RX ADMIN — METRONIDAZOLE 500 MG: 500 TABLET, FILM COATED ORAL at 16:56

## 2017-11-07 RX ADMIN — DICYCLOMINE HYDROCHLORIDE 10 MG: 10 CAPSULE ORAL at 11:44

## 2017-11-07 RX ADMIN — CARVEDILOL 6.25 MG: 6.25 TABLET, FILM COATED ORAL at 16:56

## 2017-11-07 RX ADMIN — LEVOFLOXACIN 500 MG: 5 INJECTION, SOLUTION INTRAVENOUS at 00:27

## 2017-11-07 RX ADMIN — Medication 250 MG: at 16:56

## 2017-11-07 RX ADMIN — CARVEDILOL 6.25 MG: 6.25 TABLET, FILM COATED ORAL at 09:25

## 2017-11-07 RX ADMIN — DICYCLOMINE HYDROCHLORIDE 10 MG: 10 CAPSULE ORAL at 16:56

## 2017-11-07 RX ADMIN — DICYCLOMINE HYDROCHLORIDE 10 MG: 10 CAPSULE ORAL at 00:27

## 2017-11-07 RX ADMIN — DICYCLOMINE HYDROCHLORIDE 10 MG: 10 CAPSULE ORAL at 09:25

## 2017-11-07 NOTE — PLAN OF CARE
Problem: Patient Care Overview (Adult)  Goal: Plan of Care Review  Outcome: Ongoing (interventions implemented as appropriate)    11/07/17 1711   Coping/Psychosocial Response Interventions   Plan Of Care Reviewed With patient   Patient Care Overview   Progress improving   Outcome Evaluation   Outcome Summary/Follow up Plan ABX to PO, pain well controlled, hgb follow up in am. DC tomorrow.        Goal: Adult Individualization and Mutuality  Outcome: Ongoing (interventions implemented as appropriate)  Goal: Discharge Needs Assessment  Outcome: Ongoing (interventions implemented as appropriate)    Problem: Sepsis (Adult)  Goal: Signs and Symptoms of Listed Potential Problems Will be Absent or Manageable (Sepsis)  Outcome: Ongoing (interventions implemented as appropriate)    Problem: Pain, Acute (Adult)  Goal: Identify Related Risk Factors and Signs and Symptoms  Outcome: Ongoing (interventions implemented as appropriate)  Goal: Acceptable Pain Control/Comfort Level  Outcome: Ongoing (interventions implemented as appropriate)

## 2017-11-07 NOTE — PROGRESS NOTES
"   LOS: 1 day   Patient Care Team:  Farzaneh Ball MD as PCP - General (Internal Medicine)    Chief Complaint: diarrhea, rectal bleeding    Subjective   Patient feeling a lot better today, less intestinal spasms. Patient still notes bleeding from rectum.   Abdominal Pain   Associated symptoms include hematochezia. Pertinent negatives include no nausea or vomiting.   Rectal Bleeding   Associated symptoms include abdominal pain. Pertinent negatives include no nausea or vomiting.       Subjective:  Symptoms:  Stable.    Diet:  Adequate intake.  No nausea or vomiting.    Pain:  She complains of pain that is mild.  She reports pain is improving.        History taken from: patient RN    Objective     Vital Signs  Temp:  [98.9 °F (37.2 °C)-99.4 °F (37.4 °C)] 99.3 °F (37.4 °C)  Heart Rate:  [] 101  Resp:  [16-18] 16  BP: (128-150)/(76-85) 150/85    Objective:  General Appearance:  Comfortable.    Vital signs: (most recent): Blood pressure 150/85, pulse 101, temperature 99.3 °F (37.4 °C), temperature source Oral, resp. rate 16, height 63\" (160 cm), weight 167 lb 12.8 oz (76.1 kg), SpO2 97 %.  Vital signs are normal.  (Sinus tachycardia).    Lungs:  Normal respiratory rate and normal effort.  Breath sounds clear to auscultation.    Heart: Normal rate.  Regular rhythm.  S1 normal.  No murmur, gallop or friction rub.   Abdomen: Abdomen is soft and non-distended.  There are no signs of ascites.  Bowel sounds are normal.   There is generalized tenderness.     Neurological: Patient is alert and oriented to person, place and time.    Skin:  Warm and dry.              Results Review:     I reviewed the patient's new clinical results.    Medication Review: yes    Assessment/Plan     Principal Problem:    Colitis  Active Problems:    BRBPR (bright red blood per rectum)    HTN (hypertension)    HANSA (acute kidney injury)      Assessment:  (Acute Colitis).     Plan:   (Advancing patient to bland diet. KVO IVF. Continue current plan " at this time. Will continue to trend patient's hemoglobin and monitor for any changes in clinical condition. ).       Jing Patel, ECHO  11/07/17  8:26 AM    Time: Critical care 12 min

## 2017-11-07 NOTE — PROGRESS NOTES
"  Name: Alicia Ag  ADMIT: 2017   Age/Sex: 62 y.o.female LOS:  LOS: 1 day    :    1955     ROOM: [facility]   MRN:    [id number]    PCP:    Farzaneh Ball MD     Subjective   Doing well. Having no abd cramping and less BRBPR.     Objective   Vital Signs  Temp:  [98.9 °F (37.2 °C)-99.4 °F (37.4 °C)] 99.3 °F (37.4 °C)  Heart Rate:  [] 98  Resp:  [16-18] 16  BP: (128-150)/(76-85) 150/85  Body mass index is 29.72 kg/(m^2).    Objective:  General Appearance:  Comfortable and well-appearing.    Vital signs: (most recent): Blood pressure 150/85, pulse 98, temperature 99.3 °F (37.4 °C), temperature source Oral, resp. rate 16, height 63\" (160 cm), weight 167 lb 12.8 oz (76.1 kg), SpO2 97 %.  Vital signs are normal.    HEENT: Normal HEENT exam.    Lungs:  Normal effort.  Breath sounds clear to auscultation.    Heart: Normal rate.  Regular rhythm.    Abdomen: Abdomen is soft and non-distended.  Bowel sounds are normal.   There is no abdominal tenderness.     Extremities: Normal range of motion.  There is no dependent edema.    Neurological: Patient is alert and oriented to person, place and time.    Skin:  Warm and dry.  No rash.             Results Review:       I reviewed the patient's new clinical results.    Results from last 7 days  Lab Units 17  1208 17  0406 17  0008   WBC 10*3/mm3 9.22  --  14.21* 19.19*   HEMOGLOBIN g/dL 11.8* 13.3 14.3 15.8*   PLATELETS 10*3/mm3 172  --  175 268     Results from last 7 days  Lab Units 177 17  0406 17  0008   SODIUM mmol/L 143 141 141   POTASSIUM mmol/L 3.7 3.9 3.8   CHLORIDE mmol/L 107 106 99   CO2 mmol/L 26.1 18.8* 23.8   BUN mg/dL 8 25* 26*   CREATININE mg/dL 0.79 1.29* 1.59*   GLUCOSE mg/dL 92 127* 188*   Estimated Creatinine Clearance: 70.3 mL/min (by C-G formula based on Cr of 0.79).  Results from last 7 days  Lab Units 17  0457 17  0406 17  0008   CALCIUM mg/dL 8.4* 8.3* 9.5   ALBUMIN g/dL  " --   --  4.50       RADIOLOGY  11/7/2017  Pending      carvedilol 6.25 mg Oral BID   dicyclomine 10 mg Oral 4x Daily   levoFLOXacin 500 mg Intravenous Q24H   metroNIDAZOLE 500 mg Intravenous Q8H   saccharomyces boulardii 250 mg Oral BID       lactated ringers 20 mL/hr Last Rate: 20 mL/hr (11/07/17 0926)   Diet Regular; GI Soft/[place]      Assessment/Plan   Assessment:   Principal Problem:    Colitis  Active Problems:    BRBPR (bright red blood per rectum)    HTN (hypertension)    HANSA (acute kidney injury)        Plan:   1. Colitis with BRBPR  - sepsis present on admission  - infectious vs inflammatory vs ischemic  - cont falgyl and levaquin, day 2  - CDiff negative. Stool culture with NGTD  - H/H almost 16 on admission and down to 11.8 today. Partly dilutional. Cont to monitor  - procal trended up but afebrile and WBC down to normal. Cont to monitor  - cont bentyl  - diet advanced today, off IVF     2. HANSA  - resolved with IVF  - recheck in am     3. HTN  - restarted coreg  - restart lisinopril-HCTZ today    Disposition  PT eval today. Likely home tomorrow      Leeroy Hanson MD  [facility]  11/07/17  10:35 AM

## 2017-11-07 NOTE — SIGNIFICANT NOTE
11/07/17 1114   Rehab Treatment   Discipline physical therapist   Rehab Evaluation   Evaluation Not Performed (VICTOR M Ott reports pt ambulating independently, no need for physical therapy.  DC PT, RN aware. )

## 2017-11-07 NOTE — PLAN OF CARE
Problem: Patient Care Overview (Adult)  Goal: Plan of Care Review  Outcome: Ongoing (interventions implemented as appropriate)    11/07/17 0513   Coping/Psychosocial Response Interventions   Plan Of Care Reviewed With patient   Patient Care Overview   Progress improving   Outcome Evaluation   Outcome Summary/Follow up Plan VSS. Pt had dark yellow urine with blood/sediment at one point during night. Pt c/o headache and has tylenol ordered. Slept most of night. No acute changes.        Goal: Adult Individualization and Mutuality  Outcome: Ongoing (interventions implemented as appropriate)  Goal: Discharge Needs Assessment  Outcome: Ongoing (interventions implemented as appropriate)    Problem: Sepsis (Adult)  Goal: Signs and Symptoms of Listed Potential Problems Will be Absent or Manageable (Sepsis)  Outcome: Ongoing (interventions implemented as appropriate)    Problem: Pain, Acute (Adult)  Goal: Identify Related Risk Factors and Signs and Symptoms  Outcome: Ongoing (interventions implemented as appropriate)  Goal: Acceptable Pain Control/Comfort Level  Outcome: Ongoing (interventions implemented as appropriate)

## 2017-11-07 NOTE — PROGRESS NOTES
Discharge Planning Assessment  [facility]     Patient Name: Alicia Ag  MRN: [id number]  Today's Date: 11/7/2017    Admit Date: 11/5/2017          Discharge Needs Assessment       11/07/17 0916    Living Environment    Lives With alone    Living Arrangements mobile home    Home Accessibility no concerns    Stair Railings at Home none    Type of Financial/Environmental Concern none    Transportation Available car;family or friend will provide    Living Environment    Provides Primary Care For no one    Discharge Needs Assessment    Equipment Currently Used at Home none            Discharge Plan       11/07/17 0922    Case Management/Social Work Plan    Plan Home or home with daughter Tonya Wick [phone]    Additional Comments IMM 11/6/2017.  Face sheet verified.  Prior to hospital admission patient lived independently in her own home, double wide, with ramp.   Patient states that she was independent with all aspects of her ADL's including cooking, cleaning, shopping.  Patient's states she uses [facility] and denies any challenges obtaining or affording medications.  Upon discharge patient states she may stay with her daughter Tonya Medina [phone].  Will continue to follow for discharge needs.         Discharge Placement     No information found                Demographic Summary       11/07/17 0914    Referral Information    Admission Type inpatient    Arrived From home or self-care    Referral Source admission list    Reason For Consult discharge planning    Record Reviewed clinical discipline documentation;history and physical;medical record;patient profile   Spoke with patient at bedside.     Primary Care Physician Information    Name Farzaneh Giron MD     Phone [phone]            Functional Status       11/07/17 0915    Functional Status Current    Ambulation 0-->independent    Transferring 0-->independent    Toileting 0-->independent    Bathing 0-->independent     Dressing 0-->independent    Eating 0-->independent    Communication 0-->understands/communicates without difficulty    Functional Status Prior    Ambulation 0-->independent    Transferring 0-->independent    Toileting 0-->independent    Bathing 0-->independent    Dressing 0-->independent    Eating 0-->independent    Communication 0-->understands/communicates without difficulty             My Parker, RN

## 2017-11-08 VITALS
TEMPERATURE: 98.6 F | WEIGHT: 167.8 LBS | SYSTOLIC BLOOD PRESSURE: 134 MMHG | OXYGEN SATURATION: 96 % | DIASTOLIC BLOOD PRESSURE: 84 MMHG | BODY MASS INDEX: 29.73 KG/M2 | RESPIRATION RATE: 20 BRPM | HEIGHT: 63 IN | HEART RATE: 90 BPM

## 2017-11-08 LAB
BACTERIA SPEC AEROBE CULT: ABNORMAL
BACTERIA SPEC AEROBE CULT: ABNORMAL
DEPRECATED RDW RBC AUTO: 45.8 FL (ref 37–54)
ERYTHROCYTE [DISTWIDTH] IN BLOOD BY AUTOMATED COUNT: 13.1 % (ref 11.7–13)
HCT VFR BLD AUTO: 36 % (ref 35.6–45.5)
HGB BLD-MCNC: 11.4 G/DL (ref 11.9–15.5)
MCH RBC QN AUTO: 30.7 PG (ref 26.9–32)
MCHC RBC AUTO-ENTMCNC: 31.7 G/DL (ref 32.4–36.3)
MCV RBC AUTO: 97 FL (ref 80.5–98.2)
PLATELET # BLD AUTO: 156 10*3/MM3 (ref 140–500)
PMV BLD AUTO: 9.9 FL (ref 6–12)
RBC # BLD AUTO: 3.71 10*6/MM3 (ref 3.9–5.2)
WBC NRBC COR # BLD: 6.88 10*3/MM3 (ref 4.5–10.7)

## 2017-11-08 PROCEDURE — 85027 COMPLETE CBC AUTOMATED: CPT | Performed by: INTERNAL MEDICINE

## 2017-11-08 RX ORDER — SACCHAROMYCES BOULARDII 250 MG
250 CAPSULE ORAL 2 TIMES DAILY
Qty: 30 CAPSULE | Refills: 2 | Status: SHIPPED | OUTPATIENT
Start: 2017-11-08 | End: 2021-11-29

## 2017-11-08 RX ORDER — METRONIDAZOLE 500 MG/1
500 TABLET ORAL EVERY 8 HOURS SCHEDULED
Qty: 23 TABLET | Refills: 0 | Status: SHIPPED | OUTPATIENT
Start: 2017-11-08 | End: 2017-12-19

## 2017-11-08 RX ORDER — LEVOFLOXACIN 500 MG/1
500 TABLET, FILM COATED ORAL EVERY 24 HOURS
Qty: 8 TABLET | Refills: 0 | Status: SHIPPED | OUTPATIENT
Start: 2017-11-08 | End: 2017-11-16

## 2017-11-08 RX ORDER — DICYCLOMINE HYDROCHLORIDE 10 MG/1
10 CAPSULE ORAL 4 TIMES DAILY
Qty: 20 CAPSULE | Refills: 0 | Status: SHIPPED | OUTPATIENT
Start: 2017-11-08 | End: 2021-11-29

## 2017-11-08 RX ADMIN — CARVEDILOL 6.25 MG: 6.25 TABLET, FILM COATED ORAL at 08:05

## 2017-11-08 RX ADMIN — Medication 250 MG: at 08:05

## 2017-11-08 RX ADMIN — LISINOPRIL AND HYDROCHLOROTHIAZIDE 1 TABLET: 20; 12.5 TABLET ORAL at 08:05

## 2017-11-08 RX ADMIN — METRONIDAZOLE 500 MG: 500 TABLET, FILM COATED ORAL at 08:05

## 2017-11-08 RX ADMIN — DICYCLOMINE HYDROCHLORIDE 10 MG: 10 CAPSULE ORAL at 11:56

## 2017-11-08 RX ADMIN — DICYCLOMINE HYDROCHLORIDE 10 MG: 10 CAPSULE ORAL at 08:05

## 2017-11-08 NOTE — PROGRESS NOTES
"   LOS: 2 days   Patient Care Team:  Farzaneh Ball MD as PCP - General (Internal Medicine)    Chief Complaint: diarrhea, rectal bleeding    Subjective   Patient feeling a lot better. Rectal bleeding has stopped. Suppose to be going home today.   Abdominal Pain   Associated symptoms include hematochezia. Pertinent negatives include no anorexia, diarrhea, nausea or vomiting.   Rectal Bleeding   Associated symptoms include abdominal pain. Pertinent negatives include no anorexia, nausea, vomiting or weakness.       Subjective:  Symptoms:  Improved.  No malaise, weakness, anorexia or diarrhea.    Diet:  Adequate intake.  No nausea or vomiting.    Activity level: Normal.    Pain:  She reports no pain.        History taken from: patient chart RN    Objective     Vital Signs  Temp:  [98.4 °F (36.9 °C)-98.8 °F (37.1 °C)] 98.6 °F (37 °C)  Heart Rate:  [] 90  Resp:  [16-20] 20  BP: (121-142)/(72-84) 134/84    Objective:  General Appearance:  Comfortable and well-appearing.    Vital signs: (most recent): Blood pressure 134/84, pulse 90, temperature 98.6 °F (37 °C), temperature source Oral, resp. rate 20, height 63\" (160 cm), weight 167 lb 12.8 oz (76.1 kg), SpO2 96 %.  Vital signs are normal.    Output: Producing urine and producing stool.    Lungs:  Normal respiratory rate and normal effort.  Breath sounds clear to auscultation.  No stridor.  No wheezes or rhonchi.    Heart: Normal rate.  Regular rhythm.  S1 normal.  No murmur, gallop or friction rub.   Abdomen: Abdomen is soft and non-distended.  There are no signs of ascites.  Bowel sounds are normal.   There is no abdominal tenderness.     Neurological: Patient is alert and oriented to person, place and time.    Skin:  Warm and dry.              Results Review:     I reviewed the patient's new clinical results.    Medication Review: yes    Assessment/Plan     Principal Problem:    Colitis  Active Problems:    BRBPR (bright red blood per rectum)    HTN " (hypertension)    HANSA (acute kidney injury)      Assessment:  (Acute Colitis).     Plan:   (Ok from GI standpoint to be discharged. Will have patient follow up in clinic with me or Dr. Miller to check CBC and schedule colonoscopy. Continue taking Bentyl 10mg TID as needed for spasms at home. ).       Jing Patel, APRN  11/08/17  8:45 AM    Time: Critical care 10 min

## 2017-11-08 NOTE — PLAN OF CARE
Problem: Patient Care Overview (Adult)  Goal: Plan of Care Review  Outcome: Ongoing (interventions implemented as appropriate)    11/08/17 0452   Coping/Psychosocial Response Interventions   Plan Of Care Reviewed With patient   Patient Care Overview   Progress improving   Outcome Evaluation   Outcome Summary/Follow up Plan VSS. C/o of mild headache that was relieved by PO flexeril and tylenol. No acute changes. Pt slept through night. Possible D/C today       Goal: Adult Individualization and Mutuality  Outcome: Ongoing (interventions implemented as appropriate)  Goal: Discharge Needs Assessment  Outcome: Ongoing (interventions implemented as appropriate)    Problem: Sepsis (Adult)  Goal: Signs and Symptoms of Listed Potential Problems Will be Absent or Manageable (Sepsis)  Outcome: Ongoing (interventions implemented as appropriate)    Problem: Pain, Acute (Adult)  Goal: Identify Related Risk Factors and Signs and Symptoms  Outcome: Ongoing (interventions implemented as appropriate)  Goal: Acceptable Pain Control/Comfort Level  Outcome: Ongoing (interventions implemented as appropriate)

## 2017-11-08 NOTE — PLAN OF CARE
Problem: Patient Care Overview (Adult)  Goal: Plan of Care Review  Outcome: Ongoing (interventions implemented as appropriate)    11/08/17 1013   Patient Care Overview   Progress improving   Outcome Evaluation   Outcome Summary/Follow up Plan VSS. Being discharged.        Goal: Adult Individualization and Mutuality  Outcome: Ongoing (interventions implemented as appropriate)  Goal: Discharge Needs Assessment  Outcome: Ongoing (interventions implemented as appropriate)    Problem: Sepsis (Adult)  Goal: Signs and Symptoms of Listed Potential Problems Will be Absent or Manageable (Sepsis)  Outcome: Ongoing (interventions implemented as appropriate)    Problem: Pain, Acute (Adult)  Goal: Identify Related Risk Factors and Signs and Symptoms  Outcome: Outcome(s) achieved Date Met:  11/08/17  Goal: Acceptable Pain Control/Comfort Level  Outcome: Ongoing (interventions implemented as appropriate)

## 2017-11-08 NOTE — PROGRESS NOTES
Case Management Discharge Note    Final Note: Patient discharged home.  Review of orders and review of AVS, no additional needs noted.     Discharge Placement     No information found             Discharge Codes: 01  Discharge to home

## 2017-11-09 NOTE — PROGRESS NOTES
Case Management Discharge Note    Final Note: Patient discharged home.  Review of orders and review of AVS, no additional needs noted.     Discharge Placement     No information found        Other:  (Car)    Discharge Codes: 01  Discharge to home

## 2018-01-26 ENCOUNTER — HOSPITAL ENCOUNTER (OUTPATIENT)
Facility: HOSPITAL | Age: 63
Setting detail: HOSPITAL OUTPATIENT SURGERY
Discharge: HOME | End: 2018-01-26
Attending: INTERNAL MEDICINE | Admitting: INTERNAL MEDICINE

## 2018-01-26 ENCOUNTER — ANESTHESIA EVENT (OUTPATIENT)
Dept: GASTROENTEROLOGY | Facility: HOSPITAL | Age: 63
End: 2018-01-26

## 2018-01-26 ENCOUNTER — ANESTHESIA (OUTPATIENT)
Dept: GASTROENTEROLOGY | Facility: HOSPITAL | Age: 63
End: 2018-01-26

## 2018-01-26 VITALS
RESPIRATION RATE: 16 BRPM | TEMPERATURE: 98.2 F | WEIGHT: 163.8 LBS | SYSTOLIC BLOOD PRESSURE: 139 MMHG | BODY MASS INDEX: 29.02 KG/M2 | HEIGHT: 63 IN | HEART RATE: 77 BPM | DIASTOLIC BLOOD PRESSURE: 90 MMHG | OXYGEN SATURATION: 99 %

## 2018-01-26 DIAGNOSIS — R19.7 DIARRHEA: ICD-10-CM

## 2018-01-26 PROCEDURE — 88305 TISSUE EXAM BY PATHOLOGIST: CPT | Performed by: INTERNAL MEDICINE

## 2018-01-26 PROCEDURE — 25010000002 PROPOFOL 10 MG/ML EMULSION: Performed by: ANESTHESIOLOGY

## 2018-01-26 RX ORDER — LIDOCAINE HYDROCHLORIDE 10 MG/ML
0.5 INJECTION, SOLUTION INFILTRATION; PERINEURAL ONCE AS NEEDED
Status: DISCONTINUED | OUTPATIENT
Start: 2018-01-26 | End: 2018-01-26 | Stop reason: HOSPADM

## 2018-01-26 RX ORDER — SODIUM CHLORIDE 0.9 % (FLUSH) 0.9 %
3 SYRINGE (ML) INJECTION AS NEEDED
Status: DISCONTINUED | OUTPATIENT
Start: 2018-01-26 | End: 2018-01-26 | Stop reason: HOSPADM

## 2018-01-26 RX ORDER — PROPOFOL 10 MG/ML
VIAL (ML) INTRAVENOUS CONTINUOUS PRN
Status: DISCONTINUED | OUTPATIENT
Start: 2018-01-26 | End: 2018-01-26 | Stop reason: SURG

## 2018-01-26 RX ORDER — PROPOFOL 10 MG/ML
VIAL (ML) INTRAVENOUS AS NEEDED
Status: DISCONTINUED | OUTPATIENT
Start: 2018-01-26 | End: 2018-01-26 | Stop reason: SURG

## 2018-01-26 RX ORDER — SODIUM CHLORIDE, SODIUM LACTATE, POTASSIUM CHLORIDE, CALCIUM CHLORIDE 600; 310; 30; 20 MG/100ML; MG/100ML; MG/100ML; MG/100ML
1000 INJECTION, SOLUTION INTRAVENOUS CONTINUOUS PRN
Status: DISCONTINUED | OUTPATIENT
Start: 2018-01-26 | End: 2018-01-26 | Stop reason: HOSPADM

## 2018-01-26 RX ORDER — LIDOCAINE HYDROCHLORIDE 20 MG/ML
INJECTION, SOLUTION INFILTRATION; PERINEURAL AS NEEDED
Status: DISCONTINUED | OUTPATIENT
Start: 2018-01-26 | End: 2018-01-26 | Stop reason: SURG

## 2018-01-26 RX ADMIN — SODIUM CHLORIDE, POTASSIUM CHLORIDE, SODIUM LACTATE AND CALCIUM CHLORIDE 1000 ML: 600; 310; 30; 20 INJECTION, SOLUTION INTRAVENOUS at 09:19

## 2018-01-26 RX ADMIN — LIDOCAINE HYDROCHLORIDE 50 MG: 20 INJECTION, SOLUTION INFILTRATION; PERINEURAL at 09:30

## 2018-01-26 RX ADMIN — PROPOFOL 140 MCG/KG/MIN: 10 INJECTION, EMULSION INTRAVENOUS at 09:30

## 2018-01-26 RX ADMIN — PROPOFOL 150 MG: 10 INJECTION, EMULSION INTRAVENOUS at 09:30

## 2018-01-26 NOTE — H&P
Patient Care Team:  Farzaneh Ball MD as PCP - General (Internal Medicine)    Chief complaint  diarrhea    Subjective     History of Present Illness  Patient has diarrhea, cramping abdominal discomfort, it is improved with dicyclomine.  Patient unsure of last colonoscopy  Thinks 14 years ago.   Review of Systems   All other systems reviewed and are negative.       Past Medical History:   Diagnosis Date   • Colitis     OCT 2017   • Coronary artery disease    • Depression    • GERD (gastroesophageal reflux disease)    • Hiatal hernia    • Hyperlipidemia    • Hypertension    • Myocardial infarction    • PONV (postoperative nausea and vomiting)      Past Surgical History:   Procedure Laterality Date   • HYSTERECTOMY     • NEPHRECTOMY Left      Family History   Problem Relation Age of Onset   • Malig Hyperthermia Neg Hx      Social History   Substance Use Topics   • Smoking status: Current Every Day Smoker     Packs/day: 0.25     Types: Cigarettes   • Smokeless tobacco: Never Used   • Alcohol use No     Prescriptions Prior to Admission   Medication Sig Dispense Refill Last Dose   • aspirin 81 MG chewable tablet Chew 81 mg Daily.   1/20/2018   • Carvedilol (COREG PO) Take 6.25 mg by mouth 2 (Two) Times a Day.   1/26/2018 at Unknown time   • cyclobenzaprine (FLEXERIL) 10 MG tablet Take 10 mg by mouth 3 (Three) Times a Day As Needed for Muscle Spasms.   1/26/2018 at Unknown time   • dicyclomine (BENTYL) 10 MG capsule Take 1 capsule by mouth 4 (Four) Times a Day. 20 capsule 0 1/25/2018 at Unknown time   • lisinopril-hydrochlorothiazide (PRINZIDE,ZESTORETIC) 20-12.5 MG per tablet Take 1 tablet by mouth Daily.   1/26/2018 at Unknown time   • saccharomyces boulardii (FLORASTOR) 250 MG capsule Take 1 capsule by mouth 2 (Two) Times a Day. 30 capsule 2 1/25/2018 at Unknown time     Allergies:  Penicillins    Objective      Vital Signs  Temp:  [98.1 °F (36.7 °C)] 98.1 °F (36.7 °C)  Heart Rate:  [79] 79  Resp:  [21] 21  BP:  (136)/(84) 136/84    Physical Exam   Constitutional: She is oriented to person, place, and time. She appears well-developed and well-nourished.   HENT:   Mouth/Throat: Oropharynx is clear and moist.   Eyes: Conjunctivae are normal.   Neck: Neck supple.   Cardiovascular: Normal rate.    Pulmonary/Chest: Effort normal.   Abdominal: Soft.   Neurological: She is alert and oriented to person, place, and time.   Skin: Skin is warm and dry.   Psychiatric: She has a normal mood and affect.       Results Review:   I reviewed the patient's new clinical results.      Assessment/Plan     Active Problems:    * No active hospital problems. *      Assessment:  (Diarrhea  Abdominal pain).     Plan:   (Colonoscopy risks, alternatives and benefits discussed with patient and the patient is agreeable to having procedure done.).       I discussed the patients findings and my recommendations with patient and nursing staff    Abraham Miller MD  01/26/18  9:29 AM

## 2018-01-26 NOTE — PLAN OF CARE
Problem: Patient Care Overview (Adult)  Goal: Adult Individualization and Mutuality  Outcome: Ongoing (interventions implemented as appropriate)   01/26/18 0908   Individualization   Patient Specific Interventions denines     Goal: Discharge Needs Assessment  Outcome: Ongoing (interventions implemented as appropriate)   01/26/18 0908   Discharge Needs Assessment   Concerns To Be Addressed no discharge needs identified   Discharge Disposition home or self-care   Self-Care   Equipment Currently Used at Home none       Problem: GI Endoscopy (Adult)  Goal: Signs and Symptoms of Listed Potential Problems Will be Absent or Manageable (GI Endoscopy)  Outcome: Ongoing (interventions implemented as appropriate)   01/26/18 0908   GI Endoscopy   Problems Assessed (GI Endoscopy) pain;bleeding;fluid imbalance;hypoxia/hypoxemia   Problems Present (GI Endoscopy) none

## 2018-01-26 NOTE — ANESTHESIA PREPROCEDURE EVALUATION
Anesthesia Evaluation     Patient summary reviewed          Airway   Mallampati: II  TM distance: >3 FB  Neck ROM: full  no difficulty expected  Dental    (+) lower dentures and upper dentures    Pulmonary     breath sounds clear to auscultation  Cardiovascular   Exercise tolerance: good (4-7 METS)    Rhythm: regular  Rate: normal        Neuro/Psych  GI/Hepatic/Renal/Endo      Musculoskeletal     Abdominal    Substance History      OB/GYN          Other                                                Anesthesia Plan    ASA 2     MAC     intravenous induction   Anesthetic plan and risks discussed with patient.

## 2018-01-26 NOTE — ANESTHESIA POSTPROCEDURE EVALUATION
"Patient: Alicia Lobb    Procedure Summary     Date Anesthesia Start Anesthesia Stop Room / Location    01/26/18 0924 0949  [facility] /  [facility]       Procedure Diagnosis Surgeon Provider    COLONOSCOPYto cecum and t.i. with biopsies and polypectomy (N/A ) No diagnosis on file. MD Cornell Marrero MD          Anesthesia Type: MAC  Last vitals  BP   125/83 (01/26/18 1005)   Temp   36.8 °C (98.2 °F) (01/26/18 0953)   Pulse   78 (01/26/18 1005)   Resp   16 (01/26/18 1005)     SpO2   99 % (01/26/18 1005)     Post Anesthesia Care and Evaluation    Patient location during evaluation: PACU  Patient participation: complete - patient participated  Level of consciousness: awake and alert  Pain management: adequate  Airway patency: patent  Anesthetic complications: No anesthetic complications    Cardiovascular status: acceptable  Respiratory status: acceptable  Hydration status: acceptable    Comments: /83 (BP Location: Left arm, Patient Position: Lying)  Pulse 78  Temp 36.8 °C (98.2 °F) (Oral)   Resp 16  Ht 160 cm (63\")  Wt 74.3 kg (163 lb 12.8 oz)  SpO2 99%  BMI 29.02 kg/m2      "

## 2018-01-29 LAB
CYTO UR: NORMAL
LAB AP CASE REPORT: NORMAL
Lab: NORMAL
PATH REPORT.FINAL DX SPEC: NORMAL
PATH REPORT.GROSS SPEC: NORMAL

## 2018-08-13 ENCOUNTER — OFFICE VISIT CONVERTED (OUTPATIENT)
Dept: CARDIOLOGY | Facility: CLINIC | Age: 63
End: 2018-08-13
Attending: INTERNAL MEDICINE

## 2019-03-15 ENCOUNTER — CONVERSION ENCOUNTER (OUTPATIENT)
Dept: SURGERY | Facility: CLINIC | Age: 64
End: 2019-03-15

## 2019-03-15 ENCOUNTER — OFFICE VISIT CONVERTED (OUTPATIENT)
Dept: SURGERY | Facility: CLINIC | Age: 64
End: 2019-03-15
Attending: SURGERY

## 2019-03-21 ENCOUNTER — HOSPITAL ENCOUNTER (OUTPATIENT)
Dept: PERIOP | Facility: HOSPITAL | Age: 64
Setting detail: HOSPITAL OUTPATIENT SURGERY
Discharge: HOME | End: 2019-03-21
Attending: SURGERY

## 2019-04-11 ENCOUNTER — HOSPITAL ENCOUNTER (OUTPATIENT)
Dept: OTHER | Facility: HOSPITAL | Age: 64
Discharge: HOME | End: 2019-04-11
Attending: INTERNAL MEDICINE

## 2019-04-11 LAB
ALBUMIN SERPL-MCNC: 4 G/DL (ref 3.5–5)
ALBUMIN/GLOB SERPL: 1.7 {RATIO} (ref 1.4–2.6)
ALP SERPL-CCNC: 72 U/L (ref 43–160)
ALT SERPL-CCNC: 11 U/L (ref 10–40)
ANION GAP SERPL CALC-SCNC: 16 MMOL/L (ref 8–19)
AST SERPL-CCNC: 16 U/L (ref 15–50)
BILIRUB SERPL-MCNC: 0.39 MG/DL (ref 0.2–1.3)
BUN SERPL-MCNC: 11 MG/DL (ref 5–25)
BUN/CREAT SERPL: 10 {RATIO} (ref 6–20)
CALCIUM SERPL-MCNC: 9 MG/DL (ref 8.7–10.4)
CHLORIDE SERPL-SCNC: 101 MMOL/L (ref 99–111)
CHOLEST SERPL-MCNC: 141 MG/DL (ref 107–200)
CHOLEST/HDLC SERPL: 2 {RATIO} (ref 3–6)
CONV CO2: 28 MMOL/L (ref 22–32)
CONV TOTAL PROTEIN: 6.3 G/DL (ref 6.3–8.2)
CREAT UR-MCNC: 1.12 MG/DL (ref 0.5–0.9)
GFR SERPLBLD BASED ON 1.73 SQ M-ARVRAT: 51 ML/MIN/{1.73_M2}
GLOBULIN UR ELPH-MCNC: 2.3 G/DL (ref 2–3.5)
GLUCOSE SERPL-MCNC: 90 MG/DL (ref 65–99)
HDLC SERPL-MCNC: 70 MG/DL (ref 40–60)
LDLC SERPL CALC-MCNC: 54 MG/DL (ref 70–100)
OSMOLALITY SERPL CALC.SUM OF ELEC: 289 MOSM/KG (ref 273–304)
POTASSIUM SERPL-SCNC: 4.6 MMOL/L (ref 3.5–5.3)
SODIUM SERPL-SCNC: 140 MMOL/L (ref 135–147)
TRIGL SERPL-MCNC: 86 MG/DL (ref 40–150)
VLDLC SERPL-MCNC: 17 MG/DL (ref 5–37)

## 2019-04-15 ENCOUNTER — OFFICE VISIT CONVERTED (OUTPATIENT)
Dept: CARDIOLOGY | Facility: CLINIC | Age: 64
End: 2019-04-15
Attending: INTERNAL MEDICINE

## 2019-04-30 ENCOUNTER — OFFICE VISIT CONVERTED (OUTPATIENT)
Dept: SURGERY | Facility: CLINIC | Age: 64
End: 2019-04-30
Attending: SURGERY

## 2019-05-15 ENCOUNTER — HOSPITAL ENCOUNTER (OUTPATIENT)
Dept: PERIOP | Facility: HOSPITAL | Age: 64
Setting detail: HOSPITAL OUTPATIENT SURGERY
Discharge: HOME | End: 2019-05-15
Attending: SURGERY

## 2019-05-28 ENCOUNTER — OFFICE VISIT CONVERTED (OUTPATIENT)
Dept: SURGERY | Facility: CLINIC | Age: 64
End: 2019-05-28
Attending: SURGERY

## 2019-07-12 ENCOUNTER — HOSPITAL ENCOUNTER (OUTPATIENT)
Dept: MAMMOGRAPHY | Facility: HOSPITAL | Age: 64
Discharge: HOME | End: 2019-07-12
Attending: FAMILY MEDICINE

## 2019-10-25 ENCOUNTER — OFFICE VISIT CONVERTED (OUTPATIENT)
Dept: SURGERY | Facility: CLINIC | Age: 64
End: 2019-10-25
Attending: NURSE PRACTITIONER

## 2019-12-09 ENCOUNTER — HOSPITAL ENCOUNTER (OUTPATIENT)
Dept: GASTROENTEROLOGY | Facility: HOSPITAL | Age: 64
Setting detail: HOSPITAL OUTPATIENT SURGERY
Discharge: HOME | End: 2019-12-09
Attending: SURGERY

## 2019-12-12 ENCOUNTER — HOSPITAL ENCOUNTER (OUTPATIENT)
Dept: OTHER | Facility: HOSPITAL | Age: 64
Discharge: HOME | End: 2019-12-12
Attending: INTERNAL MEDICINE

## 2019-12-12 LAB
ALBUMIN SERPL-MCNC: 4.1 G/DL (ref 3.5–5)
ALBUMIN/GLOB SERPL: 1.9 {RATIO} (ref 1.4–2.6)
ALP SERPL-CCNC: 62 U/L (ref 43–160)
ALT SERPL-CCNC: 10 U/L (ref 10–40)
ANION GAP SERPL CALC-SCNC: 18 MMOL/L (ref 8–19)
AST SERPL-CCNC: 15 U/L (ref 15–50)
BILIRUB SERPL-MCNC: 0.31 MG/DL (ref 0.2–1.3)
BUN SERPL-MCNC: 9 MG/DL (ref 5–25)
BUN/CREAT SERPL: 10 {RATIO} (ref 6–20)
CALCIUM SERPL-MCNC: 8.8 MG/DL (ref 8.7–10.4)
CHLORIDE SERPL-SCNC: 105 MMOL/L (ref 99–111)
CHOLEST SERPL-MCNC: 159 MG/DL (ref 107–200)
CHOLEST/HDLC SERPL: 2.4 {RATIO} (ref 3–6)
CONV CO2: 25 MMOL/L (ref 22–32)
CONV TOTAL PROTEIN: 6.3 G/DL (ref 6.3–8.2)
CREAT UR-MCNC: 0.86 MG/DL (ref 0.5–0.9)
GFR SERPLBLD BASED ON 1.73 SQ M-ARVRAT: >60 ML/MIN/{1.73_M2}
GLOBULIN UR ELPH-MCNC: 2.2 G/DL (ref 2–3.5)
GLUCOSE SERPL-MCNC: 80 MG/DL (ref 65–99)
HDLC SERPL-MCNC: 66 MG/DL (ref 40–60)
LDLC SERPL CALC-MCNC: 78 MG/DL (ref 70–100)
OSMOLALITY SERPL CALC.SUM OF ELEC: 296 MOSM/KG (ref 273–304)
POTASSIUM SERPL-SCNC: 4 MMOL/L (ref 3.5–5.3)
SODIUM SERPL-SCNC: 144 MMOL/L (ref 135–147)
TRIGL SERPL-MCNC: 76 MG/DL (ref 40–150)
VLDLC SERPL-MCNC: 15 MG/DL (ref 5–37)

## 2019-12-16 ENCOUNTER — OFFICE VISIT CONVERTED (OUTPATIENT)
Dept: SURGERY | Facility: CLINIC | Age: 64
End: 2019-12-16
Attending: SURGERY

## 2019-12-16 ENCOUNTER — OFFICE VISIT CONVERTED (OUTPATIENT)
Dept: CARDIOLOGY | Facility: CLINIC | Age: 64
End: 2019-12-16
Attending: INTERNAL MEDICINE

## 2020-09-30 ENCOUNTER — OFFICE VISIT CONVERTED (OUTPATIENT)
Dept: FAMILY MEDICINE CLINIC | Facility: CLINIC | Age: 65
End: 2020-09-30
Attending: FAMILY MEDICINE

## 2020-09-30 ENCOUNTER — HOSPITAL ENCOUNTER (OUTPATIENT)
Dept: GENERAL RADIOLOGY | Facility: HOSPITAL | Age: 65
Discharge: HOME | End: 2020-09-30
Attending: FAMILY MEDICINE

## 2020-10-01 LAB
25(OH)D3 SERPL-MCNC: 31.4 NG/ML (ref 30–100)
EST. AVERAGE GLUCOSE BLD GHB EST-MCNC: 105 MG/DL
HBA1C MFR BLD: 5.3 % (ref 3.5–5.7)
T4 FREE SERPL-MCNC: 1.1 NG/DL (ref 0.9–1.8)
TSH SERPL-ACNC: 1.16 M[IU]/L (ref 0.27–4.2)

## 2020-10-02 LAB — BACTERIA UR CULT: NORMAL

## 2020-10-13 ENCOUNTER — HOSPITAL ENCOUNTER (OUTPATIENT)
Dept: GENERAL RADIOLOGY | Facility: HOSPITAL | Age: 65
Discharge: HOME | End: 2020-10-13
Attending: NURSE PRACTITIONER

## 2020-10-13 ENCOUNTER — OFFICE VISIT CONVERTED (OUTPATIENT)
Dept: FAMILY MEDICINE CLINIC | Facility: CLINIC | Age: 65
End: 2020-10-13
Attending: NURSE PRACTITIONER

## 2020-10-15 ENCOUNTER — TELEPHONE CONVERTED (OUTPATIENT)
Dept: FAMILY MEDICINE CLINIC | Facility: CLINIC | Age: 65
End: 2020-10-15
Attending: FAMILY MEDICINE

## 2020-10-15 LAB
AMPICILLIN SUSC ISLT: <=2
AMPICILLIN+SULBAC SUSC ISLT: <=2
BACTERIA UR CULT: ABNORMAL
CEFAZOLIN SUSC ISLT: <=4
CEFEPIME SUSC ISLT: <=0.12
CEFTAZIDIME SUSC ISLT: <=1
CEFTRIAXONE SUSC ISLT: <=0.25
CIPROFLOXACIN SUSC ISLT: <=0.25
ERTAPENEM SUSC ISLT: <=0.12
GENTAMICIN SUSC ISLT: <=1
LEVOFLOXACIN SUSC ISLT: <=0.12
NITROFURANTOIN SUSC ISLT: <=16
PIP+TAZO SUSC ISLT: <=4
TMP SMX SUSC ISLT: <=20
TOBRAMYCIN SUSC ISLT: <=1

## 2021-01-27 ENCOUNTER — OFFICE VISIT CONVERTED (OUTPATIENT)
Dept: CARDIOLOGY | Facility: CLINIC | Age: 66
End: 2021-01-27
Attending: INTERNAL MEDICINE

## 2021-05-10 NOTE — H&P
"   History and Physical      Patient Name: Alicia Olson   Patient ID: [id number]   Sex: Female   YOB: 1955    Primary Care Provider: Silva Andrews DO   Referring Provider: Silva Andrews DO    Visit Date: September 30, 2020    Provider: Silva Andrews DO   Location: [facility]   Location Address: [address] [place] [address] [address] [place] [address]  [id number]   Location Phone: [phone]          Chief Complaint  · Establish care   · discuss kidney and bladder infections      History Of Present Illness  Alicia Olson is a 65 year old /White female who presents for evaluation and treatment of:      60, Glucose 123, LFT WNL     Labs 12/12/19: Trig 76, Cholesterol 159, LDL 78, HDL 66    Labs 3/5/18: TSH 1.7, T4 1.1    Colonoscopy 12/9/19: polyps removed, patho report showed hyperplastic polyp and hemorrhoids, follow up in 5-10 years    Mammogram 7/15/19: normal    She denies N/V/D, fever, CP, SOA, edema, blood in urine, blood in stools, dizziness, numbness, tingling. or syncopal episodes.     \">She is here today to establish relations as a new patient. She is retired. She is  and she has 3 daughters. She has great and grandchildren. She has 3 dogs.     She has UTI about once a month.     She has PMH of MI, HTN, Depression, arthritis, hyperlipdemia. PSH of neck sx, left nephrectomy, hemorrhoid banding, and hysterectomy. She had a left nephrectomy due to having reflux issues/ renal abnormality causing frequent infections.     She has seen Dr. Herrera urology in the past and was managed with estrogen cream and she says this did not help her symptoms.    Family history: Lung cancer, her dad passed away from a stroke, diabetes     Social: She smokes 1 pack per week, she has been a smoker for 7 years, she drinks very seldomly, no illicit drug use     At home her BP runs elevated, 170s systollic    She has seen Dr. Encarnacion in the past.     Labs 1/12/20: WBC " 6.78, RBC 4.12, Hgb 12.6, Hct 39, MCV 94.7, Plt 200, K+3.9, BUN 13, Cr 0.86, GFR >60, Glucose 123, LFT WNL     Labs 12/12/19: Trig 76, Cholesterol 159, LDL 78, HDL 66    Labs 3/5/18: TSH 1.7, T4 1.1    Colonoscopy 12/9/19: polyps removed, patho report showed hyperplastic polyp and hemorrhoids, follow up in 5-10 years    Mammogram 7/15/19: normal    She denies N/V/D, fever, CP, SOA, edema, blood in urine, blood in stools, dizziness, numbness, tingling. or syncopal episodes.            Past Medical History  Disease Name Date Onset Notes   Arthritis --  --    Bladder Disorder --  --    Depression --  --    Heart Attack 12-22-13 --    Heart Disease --  --    High blood pressure --  --    High cholesterol --  --    Limb Pain --  --    Limb Swelling --  --    Rectal bleeding --  --    Rectal Bleeding --  --          Past Surgical History  Procedure Name Date Notes   Colonoscopy --  --    Hemorrhoid surgery --  --    Hysterectomy 1980 partial   Kidney --  --    Neck --  --    Nephrectomy, left kidney 1980s due to congenital         Medication List  Name Date Started Instructions   aspirin 81 mg oral tablet,delayed release (DR/EC)  take 1 tablet (81 mg) by oral route once daily   atorvastatin 80 mg oral tablet 09/25/2020 Take 1 tablet by mouth once daily at bedtime   carvedilol 6.25 mg oral tablet 09/25/2020 1T PO BID   fluoxetine 20 mg oral capsule  Take two capsules in the morning and one capsule in the evening.   hydrochlorothiazide 12.5 mg oral tablet 09/25/2020 1T PO QD FOR FLUID/BLOOD PRESSURE   Nitrostat 0.4 mg sublingual tablet, sublingual 09/14/2020 Place 1 tablet under the tongue as needed for chest pain every 5 min x 3 times. If pain persist call 911/ got to ER         Allergy List  Allergen Name Date Reaction Notes   PENICILLINS --  --  --          Family Medical History  Disease Name Relative/Age Notes   Stroke Father/   --    Colon Neoplasm  --    Diabetes, unspecified type Mother/   --    Family history of  "colon cancer  Aunt/30s         Social History  Finding Status Start/Stop Quantity Notes   Active but no formal exercise --  --/-- --  --    Alcohol Never --/-- --  drinks no   Caffeine Current some day --/-- --  drinks occasionally; tea; 1-2 times per day   Denies illicit substance abuse --  --/-- --  --    Denies substance abuse --  --/-- --  --    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[employer]   Second hand smoke exposure Never --/-- --  no   Tobacco Current every day 30/50 1/2ppw former smoker; started smoking at age 28; quit smoking at age 48; smoked 21 cigarette(s) per day         Immunizations  NameDate Admin Mfg Trade Name Lot Number Route Inj VIS Given VIS Publication   [id number]/24/2019 SKB Fluarix, quadrivalent, preservative free [id number] [facility] 09/30/2020    Comments: patient had flu shot 2019   Prevnar 1309/13/2019 PFR PREVNAR 13  IM NE 09/30/2020 11/05/2015   Comments: patient stated she's had both pneumonia injections         Review of Systems  · Constitutional  o * See HPI  · Eyes  o * See HPI  · HENT  o * See HPI  · Cardiovascular  o * See HPI  · Respiratory  o * See HPI  · Gastrointestinal  o * See HPI  · Genitourinary  o * See HPI  · Integument  o * See HPI  · Neurologic  o * See HPI  · Musculoskeletal  o * See HPI      Vitals  Date Time BP Position Site L\R Cuff Size HR RR TEMP (F) WT  HT  BMI kg/m2 BSA m2 O2 Sat FR L/min FiO2        09/30/2020 02:35 /91 Sitting    76 - R 18 97.6 171lbs 2oz 5'  3\" 30.31 1.86 96 %  21%          Physical Examination  · Constitutional  o Appearance  o : obese, well developed, alert, no obvious deformities present  · Head and Face  o Head  o :   § Inspection  § : atraumatic, normocephalic  · Ears, Nose, Mouth and Throat  o Ears  o :   § External Ears  § : normal  o Nose  o :   § Intranasal Exam  § : nares patent  o Oral Cavity  o :   § Oral Mucosa  § : moist mucous membranes  · Respiratory  o Respiratory Effort  o : breathing comfortably, symmetric chest " rise  o Auscultation of Lungs  o : clear to asculatation bilaterally, no wheezes, rales, or rhonchii  · Cardiovascular  o Heart  o :   § Auscultation of Heart  § : regular rate and rhythm, no murmurs, rubs, or gallops  o Peripheral Vascular System  o :   § Extremities  § : no edema  · Skin and Subcutaneous Tissue  o General Inspection  o : no rashes present, no lesions present, no areas of discoloration, skin turgor normal, texture normal  · Neurologic  o Mental Status Examination  o :   § Orientation  § : grossly oriented to person, place and time  o Cranial Nerves  o : cranial nerves intact bilaterally  o Gait and Station  o :   § Gait Screening  § : normal gait  · Psychiatric  o General  o : normal mood and affect          Results  · In-Office Procedures  o Lab procedure  § IOP - Urinalysis (Clinitek) with Microscopy (74649)   § Color Ur: Yellow   § Clarity Ur: Clear   § Glucose Ur Ql Strip: Negative   § Bilirub Ur Ql Strip: Negative   § Ketones Ur Ql Strip: Negative   § Sp Gr Ur Qn: 1.020   § Hgb Ur Ql Strip: Negative   § pH Ur-LsCnc: 5.5   § Prot Ur Ql Strip: Negative   § Urobilinogen Ur Strip-mCnc: 1.0 E.U./dL   § Nitrite Ur Ql Strip: Negative   § WBC Est Ur Ql Strip: Trace       Assessment  · Essential hypertension     401.9/I10  Will start Amlodipine 2.5 mg PO qday, patient instructed to bring log of BP levels to next appointment  · Fatigue     780.79/R53.83  will check thyroid and Vitamin D level  · Impaired fasting glucose     790.21/R73.01  will check A1C  · Screening for depression     V79.0/Z13.89  · Visit for screening mammogram     V76.12/Z12.31  · Urine frequency     788.41/R35.0  · Osteoporosis screening     V82.81/Z13.820  · Dysuria     788.1/R30.0  urine showed trace leukocytes, intructed patient to be seen if she has symptoms of UTI. She does not feel like she has a UTI today.      Plan  · Orders  o Screening Mammography; Bilateral 2D (, 28899) - V76.12/Z12.31 - 09/30/2020  o Thyroid Profile  (33711, THYII, 87400) - 780.79/R53.83 - 09/30/2020  o ACO-39: Current medications updated and reviewed (1159F, ) - - 09/30/2020  o ACO-18: Negative screen for clinical depression using a standardized tool () - - 09/30/2020  o ACO-14: Influenza immunization administered or previously received [facility] () - - 09/30/2020  o ACO-19: Colorectal cancer screening results documented and reviewed (3017F) - - 09/30/2020  o ACO-13: Fall Risk Screening with no falls in past year or only one fall without injury in the past year (1101F) - - 09/30/2020  o ACO - Pt declines to or was not able to provide an Advance Care Plan or name a Surrogate Decision Maker (1124F) - - 09/30/2020  o Urine Culture (Clean Catch) [facility] (58585) - 788.41/R35.0 - 09/30/2020  o DEXA Bone Density, 1 or more sites, axial skeleton [facility] (52884) - V82.81/Z13.820 - 09/30/2020  o Vitamin D Level (98010) - 780.79/R53.83 - 09/30/2020  o Hgb A1c [facility] (73386) - 790.21/R73.01 - 09/30/2020  · Medications  o amlodipine 2.5 mg oral tablet   SIG: take 1 tablet (2.5 mg) by oral route once daily for 30 days   DISP: (30) Tablet with 1 refills  Prescribed on 09/30/2020     o Medications have been Reconciled  o Transition of Care or Provider Policy  · Instructions  o Depression Screen completed and scanned into the EMR under the designated folder within the patient's documents.  o Today's PHQ-9 result is __0_  o Patient was educated/instructed on their diagnosis, treatment and medications prior to discharge from the clinic today.  · Disposition  o Return Visit Request in/on 1 month +/- 2 days (99185).            Electronically Signed by: Silva Andrews DO -Author on September 30, 2020 06:32:24 PM

## 2021-05-13 NOTE — PROGRESS NOTES
Progress Note      Patient Name: Alicia Olson   Patient ID: [id number]   Sex: Female   YOB: 1955    Primary Care Provider: Silva Andrews DO   Referring Provider: Silva Andrews DO    Visit Date: October 15, 2020    Provider: Silva Andrews DO   Location: [facility]   Location Address: [address] [place] [address]  [id number]   Location Phone: [phone]          Chief Complaint  · bladder pain   · painful urination   · not urinating very much      History Of Present Illness  Alicia Olson is a 65 year old /White female who presents for evaluation and treatment of:   TELEHEALTH TELEPHONE VISIT  Alicia Olson is a 65 year old /White female who is presenting for evaluation via telehealth telephone visit. Verbal consent obtained before beginning visit.   Provider spent 10 minutes with patient during telehealth visit.   The following staff were present during this visit: Narendra RUSS, Dr.Kerry Juliana HUMPHRIES.   Past Medical History/Overview of Patient Symptoms     She is being managed today via telephone. She has is complaining of worsening back pain, dysuria, nausea and chills that have been present for several days. She was seen two days ago and a urine sent for culture. It revealed E coli that was pan sensitive at over 100,000 cfu. She denies diarrhea or vomiting.       Past Medical History  Disease Name Date Onset Notes   Arthritis --  --    Bladder Disorder --  --    Depression --  --    Heart Attack 12-22-13 --    Heart Disease --  --    High blood pressure --  --    High cholesterol --  --    Limb Pain --  --    Limb Swelling --  --    Rectal bleeding --  --    Rectal Bleeding --  --          Past Surgical History  Procedure Name Date Notes   Colonoscopy --  --    Hemorrhoid surgery --  --    Hysterectomy 1980 partial   Kidney --  --    Neck --  --    Nephrectomy, left kidney 1980s due to congenital         Medication  List  Name Date Started Instructions   amlodipine 2.5 mg oral tablet 10/13/2020 take 1 tablet (2.5 mg) by oral route once daily for 30 days   aspirin 81 mg oral tablet,delayed release (DR/EC)  take 1 tablet (81 mg) by oral route once daily   atorvastatin 80 mg oral tablet 09/25/2020 Take 1 tablet by mouth once daily at bedtime   carvedilol 6.25 mg oral tablet 09/25/2020 1T PO BID   fluoxetine 20 mg oral capsule  Take two capsules in the morning and one capsule in the evening.   hydrochlorothiazide 12.5 mg oral tablet 09/25/2020 1T PO QD FOR FLUID/BLOOD PRESSURE   lisinopril 30 mg oral tablet 10/02/2020 TAKE 1 TABLET BY MOUTH ONCE DAILY FOR BLOOD PRESSURE   Nitrostat 0.4 mg sublingual tablet, sublingual 09/14/2020 Place 1 tablet under the tongue as needed for chest pain every 5 min x 3 times. If pain persist call 911/ got to ER   oxybutynin chloride 5 mg oral tablet 10/13/2020 take 1 tablet (5 mg) by oral route 3 times per day for 30 days         Allergy List  Allergen Name Date Reaction Notes   PENICILLINS --  --  --          Family Medical History  Disease Name Relative/Age Notes   Stroke Father/   --    Colon Neoplasm  --    Diabetes, unspecified type Mother/   --    Family history of colon cancer  Aunt/30s         Social History  Finding Status Start/Stop Quantity Notes   Active but no formal exercise --  --/-- --  --    Alcohol Never --/-- --  drinks no   Caffeine Current some day --/-- --  drinks occasionally; tea; 1-2 times per day   Denies illicit substance abuse --  --/-- --  --    Denies substance abuse --  --/-- --  --    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factor   Second hand smoke exposure Never --/-- --  no   Tobacco Current every day 30/50 1/2ppw former smoker; started smoking at age 28; quit smoking at age 48; smoked 21 cigarette(s) per day         Immunizations  NameDate Admin Mfg Trade Name Lot Number Route Inj VIS Given VIS Publication   Jcvmjwoga03/24/2019 SKB Fluarix, quadrivalent, preservative free  [id number] NE NE 09/30/2020    Comments: patient had flu shot 2019   Prevnar 1309/13/2019 PFR PREVNAR 13  IM NE 09/30/2020 11/05/2015   Comments: patient stated she's had both pneumonia injections                 Assessment  · UTI (urinary tract infection)     599.0/N39.0  She was given a prescription today for pyridium and cipro to be taken as directed. She was told to drink plenty of liquids. She was told follow-up or go to the ER if her symptoms worsen.       Plan  · Orders  o ACO-39: Current medications updated and reviewed (1159F, ) - - 10/15/2020  o ACO-15: Pneumococcal Vaccine Administered or Previously Received [facility] (4040F) - - 10/15/2020  o ACO-14: Influenza immunization was not administered for reasons documented [facility] () - - 10/15/2020  o ACO-19: Colorectal cancer screening results documented and reviewed (3017F) - - 10/15/2020  o ACO-13: Fall Risk Screening with no falls in past year or only one fall without injury in the past year (1101F) - - 10/15/2020  o ACO - Pt declines to or was not able to provide an Advance Care Plan or name a Surrogate Decision Maker (1124F) - - 10/15/2020  · Medications  o Pyridium 200 mg oral tablet   SIG: take 1 tablet (200 mg) by oral route 3 times per day after meals for 2 days   DISP: (6) Tablet with 0 refills  Prescribed on 10/15/2020     o Cipro 500 mg oral tablet   SIG: take 1 tablet (500 mg) by oral route 2 times per day for 7 days   DISP: (14) Tablet with 0 refills  Prescribed on 10/15/2020     o Medications have been Reconciled  o Transition of Care or Provider Policy  · Instructions  o Patient was educated/instructed on their diagnosis, treatment and medications prior to discharge from the clinic today.  o Plan Of Care:   · Disposition  o Call or Return if symptoms worsen or persist.            Electronically Signed by: Silva Andrews DO -Author on October 18, 2020 09:34:43 PM

## 2021-05-13 NOTE — PROGRESS NOTES
"   Progress Note      Patient Name: Alicia Olson   Patient ID: [id number]   Sex: Female   YOB: 1955    Primary Care Provider: Silva Andrews DO   Referring Provider: Silva Andrews DO    Visit Date: October 13, 2020    Provider: ECHO Trujillo   Location: [facility]   Location Address: [address] [place] [address]   Location Phone: [phone]          Chief Complaint  · Possible UTI      History Of Present Illness  Alicia Olson is a 65 year old /White female who presents for evaluation and treatment of:      Pt c/o dysuria, bladder pain, hematuria, increased frequency x 1 month. Previous urinalysis on 9/30/20 WNL with same symptoms. Taking Azo, tylenol to treat. Has been to urology in the past, but \"been a long time\".     Pt with elevated BP. States she was unable to  amlodipine from pharmacy on 9/30. Taking other medications faithfully. Has FU with cardiolgy in Jan 2021. Denies SOA, chest pain, HA, blurred vision, dizziness.       Past Medical History  Disease Name Date Onset Notes   Arthritis --  --    Bladder Disorder --  --    Depression --  --    Heart Attack 12-22-13 --    Heart Disease --  --    High blood pressure --  --    High cholesterol --  --    Limb Pain --  --    Limb Swelling --  --    Rectal bleeding --  --    Rectal Bleeding --  --          Past Surgical History  Procedure Name Date Notes   Colonoscopy --  --    Hemorrhoid surgery --  --    Hysterectomy 1980 partial   Kidney --  --    Neck --  --    Nephrectomy, left kidney 1980s due to congenital         Medication List  Name Date Started Instructions   amlodipine 2.5 mg oral tablet 09/30/2020 take 1 tablet (2.5 mg) by oral route once daily for 30 days   aspirin 81 mg oral tablet,delayed release (DR/EC)  take 1 tablet (81 mg) by oral route once daily   atorvastatin 80 mg oral tablet 09/25/2020 Take 1 tablet by mouth once daily at bedtime   carvedilol 6.25 mg " oral tablet 09/25/2020 1T PO BID   fluoxetine 20 mg oral capsule  Take two capsules in the morning and one capsule in the evening.   hydrochlorothiazide 12.5 mg oral tablet 09/25/2020 1T PO QD FOR FLUID/BLOOD PRESSURE   lisinopril 30 mg oral tablet 10/02/2020 TAKE 1 TABLET BY MOUTH ONCE DAILY FOR BLOOD PRESSURE   Nitrostat 0.4 mg sublingual tablet, sublingual 09/14/2020 Place 1 tablet under the tongue as needed for chest pain every 5 min x 3 times. If pain persist call 911/ got to ER         Allergy List  Allergen Name Date Reaction Notes   PENICILLINS --  --  --        Allergies Reconciled  Family Medical History  Disease Name Relative/Age Notes   Stroke Father/   --    Colon Neoplasm  --    Diabetes, unspecified type Mother/   --    Family history of colon cancer  Aunt/30s         Social History  Finding Status Start/Stop Quantity Notes   Active but no formal exercise --  --/-- --  --    Alcohol Never --/-- --  drinks no   Caffeine Current some day --/-- --  drinks occasionally; tea; 1-2 times per day   Denies illicit substance abuse --  --/-- --  --    Denies substance abuse --  --/-- --  --    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factor   Second hand smoke exposure Never --/-- --  no   Tobacco Current every day 30/50 1/2ppw former smoker; started smoking at age 28; quit smoking at age 48; smoked 21 cigarette(s) per day         Immunizations  NameDate Admin Mfg Trade Name Lot Number Route Inj VIS Given VIS Publication   Ibhzaezcv78/24/2019 SKB Fluarix, quadrivalent, preservative free [id number] NE NE 09/30/2020    Comments: patient had flu shot 2019   Prevnar 1309/13/2019 PFR PREVNAR 13  IM NE 09/30/2020 11/05/2015   Comments: patient stated she's had both pneumonia injections         Review of Systems  · Constitutional  o Denies  o : fever, fatigue, weight loss, weight gain  · HENT  o Denies  o : headaches, vertigo, lightheadedness  · Cardiovascular  o Denies  o : lower extremity edema, claudication, chest pressure,  "palpitations  · Respiratory  o Denies  o : shortness of breath, wheezing, cough, hemoptysis, dyspnea on exertion  · Gastrointestinal  o Denies  o : nausea, vomiting, diarrhea, constipation  · Genitourinary  o Admits  o : frequency, dysuria, hematuria, bladder pain  · Psychiatric  o Denies  o : anxiety, depression, suicidal ideation, homicidal ideation      Vitals  Date Time BP Position Site L\R Cuff Size HR RR TEMP (F) WT  HT  BMI kg/m2 BSA m2 O2 Sat FR L/min FiO2 HC       10/13/2020 11:26 /97 Sitting    80 - R 20 98.2 171lbs 0oz 5'  3\" 30.29 1.86 96 %  21%    10/13/2020 11:31 /91 Sitting                       Physical Examination  · Constitutional  o Appearance  o : no acute distress, well-nourished  · Head and Face  o Head  o :   § Inspection  § : atraumatic, normocephalic  · Eyes  o Eyes  o : extraocular movements intact, no scleral icterus, no conjunctival injection  · Ears, Nose, Mouth and Throat  o Ears  o :   § External Ears  § : normal  o Nose  o :   § Intranasal Exam  § : nares patent  o Oral Cavity  o :   § Oral Mucosa  § : moist mucous membranes  · Respiratory  o Respiratory Effort  o : breathing comfortably, symmetric chest rise  o Auscultation of Lungs  o : clear to asculatation bilaterally, no wheezes, rales, or rhonchii  · Cardiovascular  o Heart  o :   § Auscultation of Heart  § : regular rate and rhythm, no murmurs, rubs, or gallops  o Peripheral Vascular System  o :   § Extremities  § : no edema  · Neurologic  o Mental Status Examination  o :   § Orientation  § : grossly oriented to person, place and time  o Gait and Station  o :   § Gait Screening  § : normal gait  · Psychiatric  o General  o : normal mood and affect          Results  · In-Office Procedures  o Lab procedure  § IOP - Urinalysis (Clinitek) with Microscopy (70646)   § Color Ur: Yellow   § Clarity Ur: Clear   § Glucose Ur Ql Strip: Negative   § Bilirub Ur Ql Strip: Negative   § Ketones Ur Ql Strip: Negative   § Sp Gr Ur " Qn: 1.020   § Hgb Ur Ql Strip: Trace-Intact   § pH Ur-LsCnc: 5.5   § Prot Ur Ql Strip: Negative   § Urobilinogen Ur Strip-mCnc: 0.2 E.U./dL   § Nitrite Ur Ql Strip: Negative   § WBC Est Ur Ql Strip: Negative       Assessment  · Essential hypertension     401.9/I10  · Bladder pain     788.99/R39.89  · Dysuria     788.1/R30.0       Bladder pain/dysuria:  Urinalysis WNL, last urine culture nml  Urine culture sent  Uncontrolled, consider IC or other  Urology consult  Oxybutinin 5mg PO tid  FU 2 weeks with Dr. Andrews    Essential Htn:  Uncontrolled  Never picked up amlodipine; error sending  Resent amlodipine 2.5mg PO qd  Continue lisinopril, carvedilol and HZTZ as prescribed  Low NA diet  Reg exercise  Take BP reg, keep log, bring to next appt  Keep FU with cardiology, consider moving appt up if BP stays elevated  FU 2 weeks  with Dr. Andrews     Problems Reconciled  Plan  · Orders  o ACO-17: Screened for tobacco use AND received tobacco cessation intervention (4004F) - - 10/13/2020  o ACO-39: Current medications updated and reviewed (, 1159F) - - 10/13/2020  o UROLOGY CONSULTATION (UROLO) - 788.99/R39.89, 788.1/R30.0 - 10/13/2020   Dr. Ha  · Medications  o oxybutynin chloride 5 mg oral tablet   SIG: take 1 tablet (5 mg) by oral route 3 times per day for 30 days   DISP: (90) Tablet with 1 refills  Prescribed on 10/13/2020     o amlodipine 2.5 mg oral tablet   SIG: take 1 tablet (2.5 mg) by oral route once daily for 30 days   DISP: (30) Tablet with 1 refills  Refilled on 10/13/2020     o Medications have been Reconciled  o Transition of Care or Provider Policy  · Instructions  o Take all medications as prescribed/directed.  o Rest. Increase Fluids.  o Patient was educated/instructed on their diagnosis, treatment and medications prior to discharge from the clinic today.  o Patient instructed to seek medical attention urgently for new or worsening symptoms.  o Call the office with any concerns or  questions.  o Bring all medicines with their bottles to each office visit.  o Risks, benefits, and alternatives were discussed with the patient. The patient is aware of risks associated with: all meds and prescribed  o Chronic conditions reviewed and taken into consideration for today's treatment plan.  o Discussed Covid-19 precautions including, but not limited to, social distancing, avoid touching your face, and hand washing.   · Disposition  o Call or Return if symptoms worsen or persist.  o Follow Up PRN.  o Follow Up in 2 weeks.  o Proceed to ED for all medical emergencies.            Electronically Signed by: ECHO Trujillo -Author on October 13, 2020 12:05:38 PM

## 2021-05-14 VITALS
HEIGHT: 63 IN | WEIGHT: 171.12 LBS | HEART RATE: 76 BPM | TEMPERATURE: 97.6 F | SYSTOLIC BLOOD PRESSURE: 169 MMHG | BODY MASS INDEX: 30.32 KG/M2 | OXYGEN SATURATION: 96 % | DIASTOLIC BLOOD PRESSURE: 91 MMHG | RESPIRATION RATE: 18 BRPM

## 2021-05-14 VITALS
OXYGEN SATURATION: 96 % | HEIGHT: 63 IN | WEIGHT: 171 LBS | TEMPERATURE: 98.2 F | BODY MASS INDEX: 30.3 KG/M2 | HEART RATE: 80 BPM | DIASTOLIC BLOOD PRESSURE: 97 MMHG | RESPIRATION RATE: 20 BRPM | SYSTOLIC BLOOD PRESSURE: 178 MMHG

## 2021-05-14 VITALS
BODY MASS INDEX: 29.23 KG/M2 | HEIGHT: 63 IN | HEART RATE: 62 BPM | SYSTOLIC BLOOD PRESSURE: 134 MMHG | WEIGHT: 165 LBS | DIASTOLIC BLOOD PRESSURE: 76 MMHG

## 2021-05-14 NOTE — PROGRESS NOTES
Progress Note      Patient Name: Alicia Olson   Patient ID: [id number]   Sex: Female   YOB: 1955    Primary Care Provider: Silva Andrews DO   Referring Provider: Aruna DODGE    Visit Date: January 27, 2021    Provider: Favian Encarnacion MD   Location: [facility]   Location Address: [address] [place] [address], [facility] [place] [facility]   ANDRE Camilo  [id number]   Location Phone: [phone]          Chief Complaint     Evaluate her coronary artery disease.       History Of Present Illness  REFERRING PROVIDER: Fco Foy MD   Alicia Olson is a 65 year old /White female who has not been here in about 14 months now. She comes in denying any chest pain, pressure, palpitations, shortness of breath, swelling, dizziness, syncope, PND or orthopnea. Cardiac wise she is feeling very well. Somewhere along the line her lisinopril disappeared and her blood pressures are high so amlodipine was added by her primary care physician. She does not remember having any side effects from the lisinopril.   PAST MEDICAL HISTORY: Arthritis; Bladder Disorder; Depression; Heart Attack; Heart Disease; High blood pressure; High cholesterol; Limb Pain; Limb Swelling; Rectal bleeding; Rectal Bleeding   PSYCHOSOCIAL HISTORY: Rarely uses alcohol. Previous use of tobacco, but quit.   CURRENT MEDICATIONS: Aspirin 81 mg oral tablet,delayed release (DR/EC); atorvastatin 80 mg oral tablet; carvedilol 6.25 mg oral tablet; fluoxetine 20 mg oral capsule; hydrochlorothiazide 12.5 mg oral tablet; amlodipine 2.5 daily; Macrobid 100 mg oral capsule; Nitrostat 0.4 mg sublingual tablet, sublingual      ALLERGIES: PENICILLINS       Review of Systems  · Cardiovascular  o Denies  o : palpitations (fast, fluttering, or skipping beats), swelling (feet, ankles, hands), shortness of breath while walking or lying flat, chest pain or angina pectoris   · Respiratory  o Denies  o : chronic or frequent cough      Vitals  Date Time BP  "Position Site L\R Cuff Size HR RR TEMP (F) WT  HT  BMI kg/m2 BSA m2 O2 Sat FR L/min FiO2 HC       01/27/2021 02:48 /76 Sitting    62 - R   165lbs 0oz 5'  3\" 29.23 1.82             Physical Examination  · Constitutional  o Appearance  o : Awake, alert, in no acute distress. She is accompanied by her .  · Eyes  o Conjunctivae  o : Conjunctivae normal.  · Ears, Nose, Mouth and Throat  o Oral Cavity  o :   § Oral Mucosa  § : Normal.  · Neck  o Jugular Veins  o : No JVD. Good carotid upstroke. No bruits noted.  · Respiratory  o Respiratory  o : Good respiratory effort. Clear to percussion and auscultation.  · Cardiovascular  o Heart  o : PMI is normal. S1, S2 normal. No S3. No S4. Negative systolic/diastolic murmur.  o Peripheral Vascular System  o :   § Extremities  § : Good femoral and pedal pulses. No pedal edema.  · Gastrointestinal  o Abdominal Examination  o : Soft. No tenderness or masses felt. No hepatosplenomegaly. Abdominal aorta is not palpable.  · Labs  o Labs  o : Not done recently.          Assessment     IMPRESSION:   1.  Coronary artery disease, without angina.  2.  Hyperlipidemia, uncontrolled.  3.  Hypertension, controlled.  4.  Nicotine dependence.    PLAN:   1.  In view of her previous MI it would be ideal for her to be on an ACE or ARB so we are going to restart lisinopril 20 mg once a day.  2.  She is going to stop amlodipine for now.  3.  She will do a blood pressure log with just hypertensive meds if needed.  4.  Continue hydrochlorothiazide and carvedilol. In view of her hypertension and coronary artery disease.   5.  Continue atorvastatin in view of her hyperlipidemia.  6.  We will check a lipid panel, CMP in 2- weeks. Will adjust meds if needed.    FOLLOW-UP: In 10 months or earlier if needed.        ECHO Perez/Favian Encarnacion MD, FACC  JF:PM:         Plan                  Electronically Signed by: ECHO Loera -Author on February 12, 2021 08:45:41 " AM  Electronically Co-signed by: Favian Encarnacion MD -Reviewer on February 21, 2021 09:08:30 PM

## 2021-05-15 VITALS
HEART RATE: 68 BPM | WEIGHT: 169 LBS | HEIGHT: 63 IN | DIASTOLIC BLOOD PRESSURE: 70 MMHG | SYSTOLIC BLOOD PRESSURE: 106 MMHG | BODY MASS INDEX: 29.95 KG/M2

## 2021-05-15 VITALS
HEIGHT: 63 IN | BODY MASS INDEX: 29.41 KG/M2 | WEIGHT: 166 LBS | HEART RATE: 64 BPM | DIASTOLIC BLOOD PRESSURE: 88 MMHG | SYSTOLIC BLOOD PRESSURE: 132 MMHG

## 2021-05-15 VITALS — RESPIRATION RATE: 14 BRPM | HEIGHT: 63 IN | BODY MASS INDEX: 29.41 KG/M2 | WEIGHT: 166 LBS

## 2021-05-15 VITALS — HEIGHT: 63 IN | BODY MASS INDEX: 30.12 KG/M2 | WEIGHT: 170 LBS | RESPIRATION RATE: 14 BRPM

## 2021-05-15 VITALS — RESPIRATION RATE: 14 BRPM | WEIGHT: 165 LBS | BODY MASS INDEX: 29.23 KG/M2 | HEIGHT: 63 IN

## 2021-05-16 VITALS — BODY MASS INDEX: 29.59 KG/M2 | RESPIRATION RATE: 16 BRPM | HEIGHT: 63 IN | WEIGHT: 167 LBS

## 2021-05-16 VITALS
SYSTOLIC BLOOD PRESSURE: 134 MMHG | WEIGHT: 164 LBS | DIASTOLIC BLOOD PRESSURE: 92 MMHG | BODY MASS INDEX: 29.06 KG/M2 | HEART RATE: 84 BPM | HEIGHT: 63 IN

## 2021-11-28 PROBLEM — I25.10 CORONARY ARTERY DISEASE INVOLVING NATIVE HEART WITHOUT ANGINA PECTORIS: Status: ACTIVE | Noted: 2021-11-28

## 2021-11-28 PROBLEM — E78.00 HIGH CHOLESTEROL: Status: ACTIVE | Noted: 2021-11-28

## 2021-11-29 ENCOUNTER — OFFICE VISIT (OUTPATIENT)
Dept: CARDIOLOGY | Facility: CLINIC | Age: 66
End: 2021-11-29

## 2021-11-29 VITALS
SYSTOLIC BLOOD PRESSURE: 168 MMHG | HEIGHT: 63 IN | BODY MASS INDEX: 31.18 KG/M2 | WEIGHT: 176 LBS | HEART RATE: 72 BPM | DIASTOLIC BLOOD PRESSURE: 94 MMHG

## 2021-11-29 DIAGNOSIS — F17.219 CIGARETTE NICOTINE DEPENDENCE WITH NICOTINE-INDUCED DISORDER: ICD-10-CM

## 2021-11-29 DIAGNOSIS — E78.00 HIGH CHOLESTEROL: ICD-10-CM

## 2021-11-29 DIAGNOSIS — I10 PRIMARY HYPERTENSION: ICD-10-CM

## 2021-11-29 DIAGNOSIS — I25.10 CORONARY ARTERY DISEASE INVOLVING NATIVE HEART WITHOUT ANGINA PECTORIS, UNSPECIFIED VESSEL OR LESION TYPE: Primary | ICD-10-CM

## 2021-11-29 PROCEDURE — 99214 OFFICE O/P EST MOD 30 MIN: CPT | Performed by: INTERNAL MEDICINE

## 2021-11-29 PROCEDURE — 99406 BEHAV CHNG SMOKING 3-10 MIN: CPT | Performed by: INTERNAL MEDICINE

## 2021-11-29 RX ORDER — LISINOPRIL 40 MG/1
40 TABLET ORAL DAILY
Qty: 90 TABLET | Refills: 3 | Status: SHIPPED | OUTPATIENT
Start: 2021-11-29

## 2021-11-29 RX ORDER — FLUOXETINE HYDROCHLORIDE 20 MG/1
20 CAPSULE ORAL DAILY
COMMUNITY

## 2021-11-29 RX ORDER — ROSUVASTATIN CALCIUM 10 MG/1
10 TABLET, COATED ORAL DAILY
COMMUNITY
End: 2022-06-01 | Stop reason: SDUPTHER

## 2021-11-29 RX ORDER — CARVEDILOL 6.25 MG/1
6.25 TABLET ORAL 2 TIMES DAILY WITH MEALS
Qty: 180 TABLET | Refills: 3 | Status: SHIPPED | OUTPATIENT
Start: 2021-11-29 | End: 2022-02-03

## 2021-11-29 RX ORDER — LISINOPRIL 20 MG/1
20 TABLET ORAL DAILY
COMMUNITY
End: 2021-11-29 | Stop reason: SDUPTHER

## 2021-11-29 RX ORDER — HYDROCHLOROTHIAZIDE 12.5 MG/1
12.5 TABLET ORAL DAILY
COMMUNITY
End: 2022-06-01 | Stop reason: SDUPTHER

## 2021-11-29 NOTE — ASSESSMENT & PLAN NOTE
Coronary artery disease is stable.  She has not had any episodes of angina in the last 6 months.  She had a previous myocardial infarction with moderate coronary artery disease affecting the distal LAD

## 2021-11-29 NOTE — ASSESSMENT & PLAN NOTE
Hypertension is not very well controlled.  I am going to increase her lisinopril 40 mg a day and after 1 week she will maintain a 2-week blood pressure log and bring it to us.

## 2021-11-29 NOTE — ASSESSMENT & PLAN NOTE
Lipid abnormalities are uncertain.  Patient was supposed to get her blood work done prior to her appointment but has not done so.  In a month she will return with a chemistry panel and lipid panel.  Since I am increasing her lisinopril I will wait a month before doing her chemistry panel as well as a lipid panel

## 2021-11-29 NOTE — ASSESSMENT & PLAN NOTE
Tobacco use is ongoing.  She has been strongly urged to completely stop smoking.  Smoking cessation counseling was performed for 5 minutes.  I have also given her a prescription for nicotine patches to help her quit smoking in the next month or 2.  Her  is also going to try to completely stop smoking.

## 2022-01-03 ENCOUNTER — PATIENT MESSAGE (OUTPATIENT)
Dept: CARDIOLOGY | Facility: CLINIC | Age: 67
End: 2022-01-03

## 2022-01-12 ENCOUNTER — HOSPITAL ENCOUNTER (EMERGENCY)
Facility: HOSPITAL | Age: 67
Discharge: HOME | End: 2022-01-12
Attending: EMERGENCY MEDICINE | Admitting: EMERGENCY MEDICINE

## 2022-01-12 VITALS
TEMPERATURE: 98.1 F | HEIGHT: 63 IN | OXYGEN SATURATION: 96 % | HEART RATE: 80 BPM | SYSTOLIC BLOOD PRESSURE: 169 MMHG | WEIGHT: 179.23 LBS | DIASTOLIC BLOOD PRESSURE: 88 MMHG | BODY MASS INDEX: 31.76 KG/M2 | RESPIRATION RATE: 18 BRPM

## 2022-01-12 DIAGNOSIS — G89.29 CHRONIC BILATERAL LOW BACK PAIN WITHOUT SCIATICA: Primary | ICD-10-CM

## 2022-01-12 DIAGNOSIS — M54.50 CHRONIC BILATERAL LOW BACK PAIN WITHOUT SCIATICA: Primary | ICD-10-CM

## 2022-01-12 PROCEDURE — 99283 EMERGENCY DEPT VISIT LOW MDM: CPT

## 2022-01-12 PROCEDURE — 96372 THER/PROPH/DIAG INJ SC/IM: CPT

## 2022-01-12 PROCEDURE — 25010000002 DEXAMETHASONE PER 1 MG: Performed by: EMERGENCY MEDICINE

## 2022-01-12 PROCEDURE — 25010000002 KETOROLAC TROMETHAMINE PER 15 MG: Performed by: EMERGENCY MEDICINE

## 2022-01-12 RX ORDER — HYDROCODONE BITARTRATE AND ACETAMINOPHEN 7.5; 325 MG/1; MG/1
TABLET ORAL
COMMUNITY
End: 2022-06-01

## 2022-01-12 RX ORDER — KETOROLAC TROMETHAMINE 30 MG/ML
30 INJECTION, SOLUTION INTRAMUSCULAR; INTRAVENOUS ONCE
Status: COMPLETED | OUTPATIENT
Start: 2022-01-12 | End: 2022-01-12

## 2022-01-12 RX ORDER — HYDROCODONE BITARTRATE AND ACETAMINOPHEN 10; 325 MG/1; MG/1
1 TABLET ORAL EVERY 6 HOURS PRN
Status: DISCONTINUED | OUTPATIENT
Start: 2022-01-12 | End: 2022-01-12 | Stop reason: HOSPADM

## 2022-01-12 RX ORDER — CYCLOBENZAPRINE HCL 10 MG
10 TABLET ORAL ONCE
Status: COMPLETED | OUTPATIENT
Start: 2022-01-12 | End: 2022-01-12

## 2022-01-12 RX ORDER — GABAPENTIN 600 MG/1
TABLET ORAL
COMMUNITY
End: 2022-03-01

## 2022-01-12 RX ORDER — DEXAMETHASONE SODIUM PHOSPHATE 10 MG/ML
10 INJECTION INTRAMUSCULAR; INTRAVENOUS ONCE
Status: COMPLETED | OUTPATIENT
Start: 2022-01-12 | End: 2022-01-12

## 2022-01-12 RX ADMIN — DEXAMETHASONE SODIUM PHOSPHATE 10 MG: 10 INJECTION INTRAMUSCULAR; INTRAVENOUS at 16:25

## 2022-01-12 RX ADMIN — CYCLOBENZAPRINE 10 MG: 10 TABLET, FILM COATED ORAL at 16:24

## 2022-01-12 RX ADMIN — KETOROLAC TROMETHAMINE 30 MG: 30 INJECTION, SOLUTION INTRAMUSCULAR; INTRAVENOUS at 16:25

## 2022-01-12 RX ADMIN — HYDROCODONE BITARTRATE AND ACETAMINOPHEN 1 TABLET: 10; 325 TABLET ORAL at 16:24

## 2022-01-12 NOTE — ED PROVIDER NOTES
Subjective   Pt states she has had chronic lower back pain for several months and has been going to pain management. States the pain has been worse the past couple of weeks and her usual meds (norco and flexeril) do not seem to be helping as much. Has an appointment at pain management next week. Denies new injury, states pain is the same as her usual pain only worse and she was advised to come to the ED for additional pain medication if needed. She denies loss of bowel or bladder function, no saddle anesthesia, no radiation of pain into her lower extremities and she is ambulatory in ED.      History provided by:  Patient   used: No        Review of Systems   Constitutional: Negative.    HENT: Negative.    Eyes: Negative.    Respiratory: Negative.    Cardiovascular: Negative.    Gastrointestinal: Negative.    Endocrine: Negative.    Genitourinary: Negative.    Musculoskeletal: Positive for back pain.   Skin: Negative.    Allergic/Immunologic: Negative.    Neurological: Negative.    Hematological: Negative.    Psychiatric/Behavioral: Negative.        Past Medical History:   Diagnosis Date   • Colitis     OCT 2017   • Coronary artery disease    • Degenerative disc disease, lumbar    • Depression    • GERD (gastroesophageal reflux disease)    • Hiatal hernia    • Hyperlipidemia    • Hypertension    • Myocardial infarction (HCC)    • PONV (postoperative nausea and vomiting)        Allergies   Allergen Reactions   • Penicillins      Does not know reaction       Past Surgical History:   Procedure Laterality Date   • COLONOSCOPY N/A 1/26/2018    Procedure: COLONOSCOPYto cecum and t.i. with biopsies and polypectomy;  Surgeon: Abraham Miller MD;  Location: [facility] ENDOSCOPY;  Service:    • HYSTERECTOMY     • NEPHRECTOMY Left        Family History   Problem Relation Age of Onset   • Malig Hyperthermia Neg Hx        Social History     Socioeconomic History   • Marital status:    Tobacco Use   •  Smoking status: Current Every Day Smoker     Packs/day: 0.25     Types: Cigarettes   • Smokeless tobacco: Never Used   Substance and Sexual Activity   • Alcohol use: No   • Drug use: No   • Sexual activity: Defer           Objective   Physical Exam  Vitals and nursing note reviewed.   Constitutional:       Appearance: Normal appearance.   HENT:      Head: Normocephalic and atraumatic.      Nose: Nose normal.      Mouth/Throat:      Mouth: Mucous membranes are moist.   Eyes:      Pupils: Pupils are equal, round, and reactive to light.   Cardiovascular:      Rate and Rhythm: Normal rate and regular rhythm.      Pulses: Normal pulses.   Pulmonary:      Effort: Pulmonary effort is normal.      Breath sounds: Normal breath sounds.   Abdominal:      General: Abdomen is flat. Bowel sounds are normal.      Palpations: Abdomen is soft.   Musculoskeletal:         General: Normal range of motion.      Cervical back: Normal range of motion.        Back:    Skin:     General: Skin is warm and dry.      Capillary Refill: Capillary refill takes less than 2 seconds.   Neurological:      General: No focal deficit present.      Mental Status: She is alert and oriented to person, place, and time. Mental status is at baseline.   Psychiatric:         Mood and Affect: Mood normal.         Procedures           ED Course                                                 MDM  Number of Diagnoses or Management Options  Chronic bilateral low back pain without sciatica: established and worsening  Diagnosis management comments: The patient´s symptoms are consistent with musculoskeletal back pain. The patient is now resting comfortably, feels better, is alert, talkative, interactive and in no distress. The repeat examination is unremarkable and benign. The patient is neurologically intact and is ambulatory in the ED. The patient has no fever, no bowel or bladder incontinence, no saddle anesthesia, and is otherwise alert and well appearing. The  history and physical exam do not suggest the presence of acute spinal epidural abscess, acute spinal epidural bleed, cauda equina syndrome, abdominal aortic aneurysm, aortic dissection or other process requiring further testing, treatment or consultation in the emergency department. The vital signs have been stable. The patient's condition is stable and appropriate for discharge. The patient will pursue further outpatient evaluation with the primary care physician or other designated for consulting position as indicated in the discharge instructions.    Risk of Complications, Morbidity, and/or Mortality  Presenting problems: low  Management options: low    Patient Progress  Patient progress: stable      Final diagnoses:   Chronic bilateral low back pain without sciatica       ED Disposition  ED Disposition     ED Disposition Condition Comment    Discharge Stable           Farzaneh Ball MD  [address] [place] [address]  [phone]      As needed         Medication List      No changes were made to your prescriptions during this visit.          Lennie Soliman, APRN  01/12/22 174

## 2022-01-24 ENCOUNTER — TELEPHONE (OUTPATIENT)
Dept: CARDIOLOGY | Facility: CLINIC | Age: 67
End: 2022-01-24

## 2022-01-24 NOTE — TELEPHONE ENCOUNTER
Blood pressures vary with a few lows but overall are good.  Continue current medications.  If has problems with the low blood pressure readings please let us know

## 2022-02-03 RX ORDER — CARVEDILOL 6.25 MG/1
TABLET ORAL
Qty: 180 TABLET | Refills: 3 | Status: SHIPPED | OUTPATIENT
Start: 2022-02-03

## 2022-03-01 ENCOUNTER — OFFICE VISIT (OUTPATIENT)
Dept: NEUROSURGERY | Facility: CLINIC | Age: 67
End: 2022-03-01

## 2022-03-01 VITALS — WEIGHT: 181 LBS | HEIGHT: 63 IN | BODY MASS INDEX: 32.07 KG/M2

## 2022-03-01 DIAGNOSIS — M47.27 OSTEOARTHRITIS OF SPINE WITH RADICULOPATHY, LUMBOSACRAL REGION: Primary | ICD-10-CM

## 2022-03-01 PROCEDURE — 99213 OFFICE O/P EST LOW 20 MIN: CPT | Performed by: NURSE PRACTITIONER

## 2022-03-01 RX ORDER — DICLOFENAC SODIUM 75 MG/1
TABLET, DELAYED RELEASE ORAL
COMMUNITY
End: 2022-06-01

## 2022-03-01 NOTE — PROGRESS NOTES
"Chief Complaint  Back Pain    Subjective          Alicia Olson who is a 67 y.o. year old female who presents to [facility] for evaluation of low back pain.     Patient presenting with concerns of low back pain, starting around three months ago. She is unsure what initiated the pain. Pain was severe, primarily in the low back, with radiating into the legs. Pt rates pain 8/10. Described as aching. Pain does radiate into the hips, groin, and the anterior thighs distribution. Pain is worse on the right side will radiate into the right foot on occasion when standing. Pain increases with prolonged walking and standing. Pt denies weakness. Pt denies problems with bowel and bladder.     Recent Interventions for Pain: Medication Management which was somewhat effective. She reports that Gabapentin does not provide her benefit and that she no longer taking this medication. She is taking Norco, currently taking the 7.5/325 mg though reports that she was previously taking Norco 10/325 mg which she reports were helping her more, lasting longer. She has received injections and lumbar RFA with pain management in the past. She is scheduled to follow up with pain management on March 17th for her chronic pain symptoms.    Prior Surgery: no              Review of Systems   Musculoskeletal: Positive for arthralgias, back pain and gait problem.   Neurological: Positive for numbness.   All other systems reviewed and are negative.       Objective   Vital Signs:   Ht 160 cm (63\")   Wt 82.1 kg (181 lb)   BMI 32.06 kg/m²       Physical Exam  Vitals reviewed.   Constitutional:       Appearance: Normal appearance.   Musculoskeletal:      Lumbar back: Tenderness present. Negative right straight leg raise test and negative left straight leg raise test.      Right hip: Tenderness present.      Left hip: Tenderness present.   Neurological:      Mental Status: She is alert and oriented to " person, place, and time.      Gait: Gait is intact.      Deep Tendon Reflexes: Strength normal.      Reflex Scores:       Patellar reflexes are 2+ on the right side and 2+ on the left side.       Achilles reflexes are 2+ on the right side and 2+ on the left side.       Neurologic Exam     Mental Status   Oriented to person, place, and time.   Level of consciousness: alert    Motor Exam   Muscle bulk: normal  Overall muscle tone: normal    Strength   Strength 5/5 throughout.     Sensory Exam   Light touch normal.     Gait, Coordination, and Reflexes     Gait  Gait: normal    Reflexes   Right patellar: 2+  Left patellar: 2+  Right achilles: 2+  Left achilles: 2+  Right ankle clonus: absent  Left ankle clonus: absent       Result Review :       Data reviewed: Radiologic studies MRI Lumbar Spine at [facility] reviewed. Multilevel degenerative changes and face arthropathy. At L4/5 there is a left sided disc herniation resulting in moderate left foraminal narrowing, mild right, mild canal stenosis. At L3/4 there is a left disc herniation displacing the left L4 nerve root with moderate left foraminal narrowing, though no canal stenosis. These are the most notable findings.          Assessment and Plan    Diagnoses and all orders for this visit:    1. Osteoarthritis of spine with radiculopathy, lumbosacral region (Primary)    Pt presenting for evaluation of low back pain with radiculopathy. We reviewed her MRI Lumbar Spine revealing multilevel degenerative changes without high grade canal or foraminal stenosis. Her pain is most significant in the back. We discussed that surgical intervention would not improve her back pain. Recommend following up with pain management for interventional and medication management. She can follow up in our clinic as needed.      Follow Up   Return if symptoms worsen or fail to improve.  Patient was given instructions and counseling regarding her condition or for health maintenance  advice.

## 2022-06-01 ENCOUNTER — OFFICE VISIT (OUTPATIENT)
Dept: CARDIOLOGY | Facility: CLINIC | Age: 67
End: 2022-06-01

## 2022-06-01 VITALS
SYSTOLIC BLOOD PRESSURE: 148 MMHG | BODY MASS INDEX: 31.71 KG/M2 | HEART RATE: 70 BPM | DIASTOLIC BLOOD PRESSURE: 88 MMHG | WEIGHT: 179 LBS | HEIGHT: 63 IN

## 2022-06-01 DIAGNOSIS — I10 PRIMARY HYPERTENSION: Primary | Chronic | ICD-10-CM

## 2022-06-01 DIAGNOSIS — I25.10 CORONARY ARTERY DISEASE INVOLVING NATIVE HEART WITHOUT ANGINA PECTORIS, UNSPECIFIED VESSEL OR LESION TYPE: Chronic | ICD-10-CM

## 2022-06-01 DIAGNOSIS — E78.00 HIGH CHOLESTEROL: Chronic | ICD-10-CM

## 2022-06-01 PROCEDURE — 99214 OFFICE O/P EST MOD 30 MIN: CPT | Performed by: NURSE PRACTITIONER

## 2022-06-01 RX ORDER — ROSUVASTATIN CALCIUM 10 MG/1
10 TABLET, COATED ORAL DAILY
Qty: 90 TABLET | Refills: 2 | Status: SHIPPED | OUTPATIENT
Start: 2022-06-01

## 2022-06-01 RX ORDER — CELECOXIB 100 MG/1
100 CAPSULE ORAL 2 TIMES DAILY PRN
COMMUNITY

## 2022-06-01 RX ORDER — HYDROCHLOROTHIAZIDE 12.5 MG/1
12.5 TABLET ORAL DAILY
Qty: 90 TABLET | Refills: 2 | Status: SHIPPED | OUTPATIENT
Start: 2022-06-01

## 2022-06-01 NOTE — ASSESSMENT & PLAN NOTE
History whitecoat syndrome.  Agrees to do a blood pressure log now and also before next office visit.  And will adjust meds accordingly.  Continue lisinopril 40 mg, hydrochlorothiazide 12.5 mg, and carvedilol 6.25 twice daily

## 2022-06-01 NOTE — ASSESSMENT & PLAN NOTE
Without angina.  Continue aspirin 81 mg.  Patient is taking both Celebrex and diclofenac.  Instructed to stop diclofenac.  Agrees to do labs and if kidneys are abnormal will caution regarding Celebrex.

## 2022-06-01 NOTE — ASSESSMENT & PLAN NOTE
Unknown control.  Agrees to labs in the next week and will adjust meds accordingly.  Continue rosuvastatin 10 mg for now.

## 2023-05-09 ENCOUNTER — OFFICE VISIT (OUTPATIENT)
Dept: CARDIOLOGY | Facility: CLINIC | Age: 68
End: 2023-05-09
Payer: MEDICARE

## 2023-05-09 VITALS
WEIGHT: 186.6 LBS | HEART RATE: 79 BPM | SYSTOLIC BLOOD PRESSURE: 180 MMHG | BODY MASS INDEX: 33.06 KG/M2 | DIASTOLIC BLOOD PRESSURE: 116 MMHG | HEIGHT: 63 IN

## 2023-05-09 DIAGNOSIS — I10 PRIMARY HYPERTENSION: Primary | ICD-10-CM

## 2023-05-09 DIAGNOSIS — R60.0 BILATERAL LOWER EXTREMITY EDEMA: ICD-10-CM

## 2023-05-09 DIAGNOSIS — E78.00 HIGH CHOLESTEROL: ICD-10-CM

## 2023-05-09 DIAGNOSIS — I25.10 CORONARY ARTERY DISEASE INVOLVING NATIVE HEART WITHOUT ANGINA PECTORIS, UNSPECIFIED VESSEL OR LESION TYPE: ICD-10-CM

## 2023-05-09 RX ORDER — HYDROCHLOROTHIAZIDE 25 MG/1
25 TABLET ORAL DAILY
Qty: 30 TABLET | Refills: 3 | Status: SHIPPED | OUTPATIENT
Start: 2023-05-09

## 2023-05-09 RX ORDER — FUROSEMIDE 20 MG/1
20 TABLET ORAL DAILY
Qty: 30 TABLET | Refills: 0 | Status: SHIPPED | OUTPATIENT
Start: 2023-05-09

## 2023-05-09 RX ORDER — LISINOPRIL 40 MG/1
40 TABLET ORAL DAILY
Qty: 90 TABLET | Refills: 3 | Status: SHIPPED | OUTPATIENT
Start: 2023-05-09

## 2023-05-09 NOTE — PROGRESS NOTES
Chief Complaint  Hypertension, Coronary Artery Disease, Follow-up, Edema, and Shortness of Breath (/)    Subjective        History of Present Illness  Alicia Olson presents to [facility] for follow up.  68-year-old  female with past medical history as outlined below, significant for coronary artery disease, hypertension, hyperlipidemia.  She has been having bilateral lower extremity swelling for the last 2 weeks.  She denies any dyspnea or orthopnea.  She denies any chest pain, palpitations, dizziness, syncope      Past Medical History:   Diagnosis Date   • Colitis     OCT 2017   • Coronary artery disease    • Degenerative disc disease, lumbar    • Depression    • GERD (gastroesophageal reflux disease)    • Hiatal hernia    • Hyperlipidemia    • Hypertension    • Low back pain    • Myocardial infarction    • PONV (postoperative nausea and vomiting)        ALLERGY  Allergies   Allergen Reactions   • Penicillins      Does not know reaction        Past Surgical History:   Procedure Laterality Date   • COLONOSCOPY N/A 1/26/2018    Procedure: COLONOSCOPYto cecum and t.i. with biopsies and polypectomy;  Surgeon: Abraham Miller MD;  Location: [facility] ENDOSCOPY;  Service:    • HYSTERECTOMY     • NEPHRECTOMY Left         Social History     Socioeconomic History   • Marital status:    Tobacco Use   • Smoking status: Every Day     Packs/day: 0.25     Years: 4.00     Pack years: 1.00     Types: Cigarettes   • Smokeless tobacco: Never   Vaping Use   • Vaping Use: Never used   Substance and Sexual Activity   • Alcohol use: No   • Drug use: No   • Sexual activity: Defer       Family History   Problem Relation Age of Onset   • Malig Hyperthermia Neg Hx         Current Outpatient Medications on File Prior to Visit   Medication Sig   • aspirin 81 MG chewable tablet Chew 1 tablet Daily.   • carvedilol (COREG) 6.25 MG tablet TAKE 1 TABLET TWICE DAILY   • celecoxib (CeleBREX) 100  "MG capsule Take 1 capsule by mouth 2 (Two) Times a Day As Needed for Mild Pain.   • cyclobenzaprine (FLEXERIL) 10 MG tablet Take 1 tablet by mouth 3 (Three) Times a Day As Needed for Muscle Spasms.   • FLUoxetine (PROzac) 20 MG capsule Take 1 capsule by mouth Daily.   • nicotine (Nicoderm CQ) 7 MG/24HR patch Place 1 patch on the skin as directed by provider Daily.   • rosuvastatin (CRESTOR) 10 MG tablet Take 1 tablet by mouth Daily.   • [DISCONTINUED] hydroCHLOROthiazide (HYDRODIURIL) 12.5 MG tablet Take 1 tablet by mouth Daily.   • [DISCONTINUED] lisinopril (PRINIVIL,ZESTRIL) 40 MG tablet Take 1 tablet by mouth Daily.     No current facility-administered medications on file prior to visit.       Objective   Vitals:    05/09/23 0927 05/09/23 0928   BP: (!) 190/92 (!) 180/116   Pulse: 86 79   Weight: 84.6 kg (186 lb 9.6 oz)    Height: 160 cm (63\")        Physical Exam  Constitutional:       General: She is awake. She is not in acute distress.     Appearance: Normal appearance.   HENT:      Head: Normocephalic.      Nose: Nose normal. No congestion.   Eyes:      Extraocular Movements: Extraocular movements intact.      Conjunctiva/sclera: Conjunctivae normal.      Pupils: Pupils are equal, round, and reactive to light.   Neck:      Thyroid: No thyromegaly.      Vascular: No JVD.   Cardiovascular:      Rate and Rhythm: Normal rate and regular rhythm.      Chest Wall: PMI is not displaced.      Pulses: Normal pulses.      Heart sounds: Normal heart sounds, S1 normal and S2 normal. No murmur heard.    No friction rub. No gallop. No S3 or S4 sounds.   Pulmonary:      Effort: Pulmonary effort is normal.      Breath sounds: Normal breath sounds. No wheezing, rhonchi or rales.   Abdominal:      General: Bowel sounds are normal.      Palpations: Abdomen is soft.      Tenderness: There is no abdominal tenderness.   Musculoskeletal:      Cervical back: No tenderness.      Right lower leg: Edema present.      Left lower leg: " Edema present.   Lymphadenopathy:      Cervical: No cervical adenopathy.   Skin:     General: Skin is warm and dry.      Capillary Refill: Capillary refill takes less than 2 seconds.      Coloration: Skin is not cyanotic.      Findings: No petechiae or rash.      Nails: There is no clubbing.   Neurological:      Mental Status: She is alert.   Psychiatric:         Mood and Affect: Mood normal.         Behavior: Behavior is cooperative.           Result Review     The following data was reviewed by ECHO Vale on 05/09/23.                      Assessment & Plan  Diagnoses and all orders for this visit:    1. Primary hypertension (Primary)  -     BNP; Future  -     hydroCHLOROthiazide (HYDRODIURIL) 25 MG tablet; Take 1 tablet by mouth Daily.  Dispense: 30 tablet; Refill: 3    2. High cholesterol  -     Lipid Panel; Future    3. Coronary artery disease involving native heart without angina pectoris, unspecified vessel or lesion type  -     hydroCHLOROthiazide (HYDRODIURIL) 25 MG tablet; Take 1 tablet by mouth Daily.  Dispense: 30 tablet; Refill: 3    4. Bilateral lower extremity edema  -     Adult Transthoracic Echo Complete w/ Color, Spectral and Contrast if necessary per protocol; Future  -     Basic Metabolic Panel; Future    Other orders  -     lisinopril (PRINIVIL,ZESTRIL) 40 MG tablet; Take 1 tablet by mouth Daily.  Dispense: 90 tablet; Refill: 3  -     furosemide (LASIX) 20 MG tablet; Take 1 tablet by mouth Daily.  Dispense: 30 tablet; Refill: 0      1.  Blood pressure is markedly elevated in the office today and she reports elevated readings at home as well.  She has some degree of documented whitecoat hypertension however given her home blood pressure readings are also elevated we will increase her hydrochlorothiazide to 25 mg daily.  She will also be started on Lasix 20 mg daily for bilateral lower extremity edema.  She will monitor her blood pressure twice daily and complete a home blood pressure  log to be return to the office for my review.  Can consider further adjustments at that time.   2.  Unknown control.  Plan to check a lipid panel.  Continue rosuvastatin 10 mg daily.  3.  Without angina or anginal-like symptoms.  Continue aspirin and carvedilol.  4.  She has bilateral lower extremity edema that is relatively new.  She has not had an echocardiogram since 2015.  Plan to check an echo, BMP, BNP for further evaluation.  Further recommendations to follow.  In the meantime she will be started on Lasix 20 mg daily.          Follow Up   No follow-ups on file.    Patient was given instructions and counseling regarding her condition or for health maintenance advice. Please see specific information pulled into the AVS if appropriate.     Naomie Franklin, APRN  05/09/23  09:26 EDT    Dictated Utilizing Dragon Dictation

## 2023-05-15 ENCOUNTER — HOSPITAL ENCOUNTER (OUTPATIENT)
Dept: CARDIOLOGY | Facility: HOSPITAL | Age: 68
Discharge: HOME | End: 2023-05-15
Payer: MEDICARE

## 2023-05-15 ENCOUNTER — LAB (OUTPATIENT)
Dept: LAB | Facility: HOSPITAL | Age: 68
End: 2023-05-15
Payer: MEDICARE

## 2023-05-15 DIAGNOSIS — R60.0 BILATERAL LOWER EXTREMITY EDEMA: ICD-10-CM

## 2023-05-15 DIAGNOSIS — E78.00 HIGH CHOLESTEROL: Chronic | ICD-10-CM

## 2023-05-15 DIAGNOSIS — I10 PRIMARY HYPERTENSION: ICD-10-CM

## 2023-05-15 LAB
ALBUMIN SERPL-MCNC: 4.1 G/DL (ref 3.5–5.2)
ALBUMIN/GLOB SERPL: 2.4 G/DL
ALP SERPL-CCNC: 72 U/L (ref 39–117)
ALT SERPL W P-5'-P-CCNC: 7 U/L (ref 1–33)
ANION GAP SERPL CALCULATED.3IONS-SCNC: 9.3 MMOL/L (ref 5–15)
AST SERPL-CCNC: 13 U/L (ref 1–32)
BILIRUB SERPL-MCNC: 0.3 MG/DL (ref 0–1.2)
BUN SERPL-MCNC: 14 MG/DL (ref 8–23)
BUN/CREAT SERPL: 15.4 (ref 7–25)
CALCIUM SPEC-SCNC: 9 MG/DL (ref 8.6–10.5)
CHLORIDE SERPL-SCNC: 106 MMOL/L (ref 98–107)
CHOLEST SERPL-MCNC: 233 MG/DL (ref 0–200)
CO2 SERPL-SCNC: 27.7 MMOL/L (ref 22–29)
CREAT SERPL-MCNC: 0.91 MG/DL (ref 0.57–1)
EGFRCR SERPLBLD CKD-EPI 2021: 68 ML/MIN/1.73
GLOBULIN UR ELPH-MCNC: 1.7 GM/DL
GLUCOSE SERPL-MCNC: 89 MG/DL (ref 65–99)
HDLC SERPL-MCNC: 55 MG/DL (ref 40–60)
LDLC SERPL CALC-MCNC: 156 MG/DL (ref 0–100)
LDLC/HDLC SERPL: 2.79 {RATIO}
NT-PROBNP SERPL-MCNC: 196 PG/ML (ref 0–900)
POTASSIUM SERPL-SCNC: 3.9 MMOL/L (ref 3.5–5.2)
PROT SERPL-MCNC: 5.8 G/DL (ref 6–8.5)
SODIUM SERPL-SCNC: 143 MMOL/L (ref 136–145)
TRIGL SERPL-MCNC: 124 MG/DL (ref 0–150)
VLDLC SERPL-MCNC: 22 MG/DL (ref 5–40)

## 2023-05-15 PROCEDURE — 93306 TTE W/DOPPLER COMPLETE: CPT | Performed by: SPECIALIST

## 2023-05-15 PROCEDURE — 36415 COLL VENOUS BLD VENIPUNCTURE: CPT

## 2023-05-15 PROCEDURE — 93306 TTE W/DOPPLER COMPLETE: CPT

## 2023-05-15 PROCEDURE — 80053 COMPREHEN METABOLIC PANEL: CPT

## 2023-05-15 PROCEDURE — 80061 LIPID PANEL: CPT

## 2023-05-15 PROCEDURE — 83880 ASSAY OF NATRIURETIC PEPTIDE: CPT

## 2023-05-16 ENCOUNTER — TELEPHONE (OUTPATIENT)
Dept: CARDIOLOGY | Facility: CLINIC | Age: 68
End: 2023-05-16
Payer: MEDICARE

## 2023-05-16 LAB
BH CV ECHO MEAS - AO ROOT DIAM: 2.7 CM
BH CV ECHO MEAS - EDV(CUBED): 109.2 ML
BH CV ECHO MEAS - EDV(MOD-SP2): 63.1 ML
BH CV ECHO MEAS - EDV(MOD-SP4): 52.2 ML
BH CV ECHO MEAS - EF(MOD-BP): 55 %
BH CV ECHO MEAS - EF(MOD-SP2): 50.6 %
BH CV ECHO MEAS - EF(MOD-SP4): 62.1 %
BH CV ECHO MEAS - ESV(CUBED): 54 ML
BH CV ECHO MEAS - ESV(MOD-SP2): 31.2 ML
BH CV ECHO MEAS - ESV(MOD-SP4): 19.8 ML
BH CV ECHO MEAS - FS: 20.9 %
BH CV ECHO MEAS - IVS/LVPW: 0.91 CM
BH CV ECHO MEAS - IVSD: 1.19 CM
BH CV ECHO MEAS - LA DIMENSION: 3.4 CM
BH CV ECHO MEAS - LAT PEAK E' VEL: 5.8 CM/SEC
BH CV ECHO MEAS - LV MASS(C)D: 230.7 GRAMS
BH CV ECHO MEAS - LVIDD: 4.8 CM
BH CV ECHO MEAS - LVIDS: 3.8 CM
BH CV ECHO MEAS - LVPWD: 1.31 CM
BH CV ECHO MEAS - MED PEAK E' VEL: 4.4 CM/SEC
BH CV ECHO MEAS - MV A MAX VEL: 87.4 CM/SEC
BH CV ECHO MEAS - MV DEC SLOPE: 436 CM/SEC2
BH CV ECHO MEAS - MV DEC TIME: 0.16 MSEC
BH CV ECHO MEAS - MV E MAX VEL: 70.7 CM/SEC
BH CV ECHO MEAS - MV E/A: 0.81
BH CV ECHO MEAS - RVDD: 2.27 CM
BH CV ECHO MEAS - SV(MOD-SP2): 31.9 ML
BH CV ECHO MEAS - SV(MOD-SP4): 32.4 ML
BH CV ECHO MEASUREMENTS AVERAGE E/E' RATIO: 13.86
LEFT ATRIUM VOLUME INDEX: 28.8 ML/M2
MAXIMAL PREDICTED HEART RATE: 150 BPM
STRESS TARGET HR: 128 BPM

## 2023-05-16 NOTE — PROGRESS NOTES
Notify patient the results of her  ultrasound of her  heart was normal.  The heart is functioning well and there were no structural or valvular abnormalities.

## 2023-05-16 NOTE — TELEPHONE ENCOUNTER
----- Message from ECHO Hall sent at 5/16/2023 11:35 AM EDT -----  Notify patient the results of her  ultrasound of her  heart was normal.  The heart is functioning well and there were no structural or valvular abnormalities.

## 2023-08-10 ENCOUNTER — OFFICE VISIT (OUTPATIENT)
Dept: CARDIOLOGY | Facility: CLINIC | Age: 68
End: 2023-08-10
Payer: MEDICARE

## 2023-08-10 VITALS
BODY MASS INDEX: 32.6 KG/M2 | SYSTOLIC BLOOD PRESSURE: 154 MMHG | HEART RATE: 76 BPM | DIASTOLIC BLOOD PRESSURE: 97 MMHG | HEIGHT: 63 IN | WEIGHT: 184 LBS

## 2023-08-10 DIAGNOSIS — E78.00 HIGH CHOLESTEROL: Chronic | ICD-10-CM

## 2023-08-10 DIAGNOSIS — I10 PRIMARY HYPERTENSION: ICD-10-CM

## 2023-08-10 DIAGNOSIS — Z72.0 TOBACCO ABUSE: ICD-10-CM

## 2023-08-10 DIAGNOSIS — I25.10 CORONARY ARTERY DISEASE INVOLVING NATIVE HEART WITHOUT ANGINA PECTORIS, UNSPECIFIED VESSEL OR LESION TYPE: Primary | ICD-10-CM

## 2023-08-10 DIAGNOSIS — E78.2 MIXED HYPERLIPIDEMIA: ICD-10-CM

## 2023-08-10 PROCEDURE — 99214 OFFICE O/P EST MOD 30 MIN: CPT | Performed by: INTERNAL MEDICINE

## 2023-08-10 PROCEDURE — 3080F DIAST BP >= 90 MM HG: CPT | Performed by: INTERNAL MEDICINE

## 2023-08-10 PROCEDURE — 3077F SYST BP >= 140 MM HG: CPT | Performed by: INTERNAL MEDICINE

## 2023-08-10 RX ORDER — TRAMADOL HYDROCHLORIDE 50 MG/1
50 TABLET ORAL EVERY 6 HOURS PRN
COMMUNITY

## 2023-08-10 RX ORDER — VARENICLINE TARTRATE 1 MG/1
1 TABLET, FILM COATED ORAL 2 TIMES DAILY
Qty: 60 TABLET | Refills: 3 | Status: SHIPPED | OUTPATIENT
Start: 2023-08-10

## 2023-08-10 RX ORDER — CARVEDILOL 12.5 MG/1
12.5 TABLET ORAL 2 TIMES DAILY
Qty: 180 TABLET | Refills: 3 | Status: SHIPPED | OUTPATIENT
Start: 2023-08-10

## 2023-08-10 RX ORDER — ROSUVASTATIN CALCIUM 10 MG/1
10 TABLET, COATED ORAL DAILY
Qty: 90 TABLET | Refills: 2 | Status: SHIPPED | OUTPATIENT
Start: 2023-08-10

## 2023-08-10 NOTE — PROGRESS NOTES
Chief Complaint  Coronary Artery Disease    Subjective        Alicia Olson presents to [facility] CARDIOLOGY  History of present illness:    Patient states her blood pressure has been running elevated.  She is taking all the medications as prescribed.  She has not been taking the Crestor as apparently she did not get any refills.  She did not have any problems with that.  She does continue to smoke.  She denies any palpitations.  She notes no bleeding problems.  Some mild right ankle edema that she attributes to arthritis.  She does watch her salt.  She is taking the Lasix just as needed.      Past Medical History:   Diagnosis Date    Colitis     OCT 2017    Coronary artery disease     Degenerative disc disease, lumbar     Depression     GERD (gastroesophageal reflux disease)     Hiatal hernia     Hyperlipidemia     Hypertension     Low back pain     Myocardial infarction     PONV (postoperative nausea and vomiting)          Past Surgical History:   Procedure Laterality Date    COLONOSCOPY N/A 1/26/2018    Procedure: COLONOSCOPYto cecum and t.i. with biopsies and polypectomy;  Surgeon: Abraham Miller MD;  Location: [facility] ENDOSCOPY;  Service:     HYSTERECTOMY      NEPHRECTOMY Left           Social History     Socioeconomic History    Marital status:    Tobacco Use    Smoking status: Every Day     Packs/day: 0.25     Years: 4.00     Pack years: 1.00     Types: Cigarettes    Smokeless tobacco: Never   Vaping Use    Vaping Use: Never used   Substance and Sexual Activity    Alcohol use: No    Drug use: No    Sexual activity: Defer         Family History   Problem Relation Age of Onset    Malig Hyperthermia Neg Hx           Allergies   Allergen Reactions    Penicillins      Does not know reaction            Current Outpatient Medications:     aspirin 81 MG chewable tablet, Chew 1 tablet Daily., Disp: , Rfl:     carvedilol (COREG) 12.5 MG tablet, Take 1 tablet by mouth 2 (Two) Times a  "Day., Disp: 180 tablet, Rfl: 3    celecoxib (CeleBREX) 100 MG capsule, Take 1 capsule by mouth 2 (Two) Times a Day As Needed for Mild Pain., Disp: , Rfl:     cyclobenzaprine (FLEXERIL) 10 MG tablet, Take 1 tablet by mouth 3 (Three) Times a Day As Needed for Muscle Spasms., Disp: , Rfl:     FLUoxetine (PROzac) 20 MG capsule, Take 1 capsule by mouth Daily., Disp: , Rfl:     furosemide (LASIX) 20 MG tablet, Take 1 tablet by mouth Daily., Disp: 30 tablet, Rfl: 0    hydroCHLOROthiazide (HYDRODIURIL) 25 MG tablet, Take 1 tablet by mouth Daily., Disp: 30 tablet, Rfl: 3    lisinopril (PRINIVIL,ZESTRIL) 40 MG tablet, Take 1 tablet by mouth Daily., Disp: 90 tablet, Rfl: 3    nicotine (Nicoderm CQ) 7 MG/24HR patch, Place 1 patch on the skin as directed by provider Daily., Disp: 30 each, Rfl: 1    rosuvastatin (CRESTOR) 10 MG tablet, Take 1 tablet by mouth Daily., Disp: 90 tablet, Rfl: 2    traMADol (ULTRAM) 50 MG tablet, Take 1 tablet by mouth Every 6 (Six) Hours As Needed for Moderate Pain., Disp: , Rfl:     varenicline (Chantix Continuing Month John) 1 MG tablet, Take 1 tablet by mouth 2 (Two) Times a Day., Disp: 60 tablet, Rfl: 3      ROS:  Cardiac review of systems is negative.    Objective     /97   Pulse 76   Ht 160 cm (63\")   Wt 83.5 kg (184 lb)   BMI 32.59 kg/mý       General Appearance:   well developed  well nourished  HENT:   oropharynx moist  lips not cyanotic  Respiratory:  no respiratory distress  normal breath sounds  no rales  Cardiovascular:  no jugular venous distention  regular rhythm  S1 normal, S2 normal  no S3, no S4   no murmur  no rub, no thrill  No carotid bruit  pedal pulses normal  lower extremity edema: none    Musculoskeletal:  no clubbing of fingers.   normocephalic, head atraumatic  Skin:   warm, dry  Psychiatric:  judgement and insight appropriate  normal mood and affect    ECHO:  Results for orders placed during the hospital encounter of 05/15/23    Adult Transthoracic Echo Complete w/ " Color, Spectral and Contrast if necessary per protocol    Interpretation Summary  Normal left ventricular systolic function.  Ejection fraction around 55%.  No significant valve abnormalities noted.    STRESS:    CATH:  No results found for this or any previous visit.    BMP:     Glucose   Date Value Ref Range Status   05/15/2023 89 65 - 99 mg/dL Final     BUN   Date Value Ref Range Status   05/15/2023 14 8 - 23 mg/dL Final     Creatinine   Date Value Ref Range Status   05/15/2023 0.91 0.57 - 1.00 mg/dL Final     Sodium   Date Value Ref Range Status   05/15/2023 143 136 - 145 mmol/L Final     Potassium   Date Value Ref Range Status   05/15/2023 3.9 3.5 - 5.2 mmol/L Final     Chloride   Date Value Ref Range Status   05/15/2023 106 98 - 107 mmol/L Final     CO2   Date Value Ref Range Status   05/15/2023 27.7 22.0 - 29.0 mmol/L Final     Calcium   Date Value Ref Range Status   05/15/2023 9.0 8.6 - 10.5 mg/dL Final     BUN/Creatinine Ratio   Date Value Ref Range Status   05/15/2023 15.4 7.0 - 25.0 Final     Anion Gap   Date Value Ref Range Status   05/15/2023 9.3 5.0 - 15.0 mmol/L Final     eGFR   Date Value Ref Range Status   05/15/2023 68.0 >60.0 mL/min/1.73 Final     LIPIDS:  Total Cholesterol   Date Value Ref Range Status   05/15/2023 233 (H) 0 - 200 mg/dL Final     Triglycerides   Date Value Ref Range Status   05/15/2023 124 0 - 150 mg/dL Final     HDL Cholesterol   Date Value Ref Range Status   05/15/2023 55 40 - 60 mg/dL Final     LDL Cholesterol    Date Value Ref Range Status   05/15/2023 156 (H) 0 - 100 mg/dL Final     VLDL Cholesterol   Date Value Ref Range Status   05/15/2023 22 5 - 40 mg/dL Final     LDL/HDL Ratio   Date Value Ref Range Status   05/15/2023 2.79  Final         Procedures             ASSESSMENT:  Diagnoses and all orders for this visit:    1. Coronary artery disease involving native heart without angina pectoris, unspecified vessel or lesion type (Primary)    2. Primary hypertension    3.  Mixed hyperlipidemia    4. High cholesterol  -     rosuvastatin (CRESTOR) 10 MG tablet; Take 1 tablet by mouth Daily.  Dispense: 90 tablet; Refill: 2    5. Tobacco abuse    Other orders  -     varenicline (Chantix Continuing Month Pak) 1 MG tablet; Take 1 tablet by mouth 2 (Two) Times a Day.  Dispense: 60 tablet; Refill: 3  -     carvedilol (COREG) 12.5 MG tablet; Take 1 tablet by mouth 2 (Two) Times a Day.  Dispense: 180 tablet; Refill: 3         PLAN:    1.  Patient had a non-STEMI back in 2013.  She went for left heart cath which showed mild to moderate disease with most a 50% distal LAD stenosis and just had medical management.  2.  Will increase patient's Coreg to 12.5 mg p.o. twice daily to try to help with the blood pressure.  3.  Will refill patient's Crestor.  When she was on it her cholesterol was under good control.  She is not sure what happened to the medication but she did not have any significant side effects.  4.  Encourage smoking cessation.  The patient is in agreement to try Chantix.  We did talk about potential side effects.  5.  Patient is taking the Lasix just as needed for edema.  She is trying to watch her salt.  6.  Continue the aspirin.  The patient notes no problems with bleeding.      Return in about 6 months (around 2/10/2024).     Patient was given instructions and counseling regarding her condition or for health maintenance advice. Please see specific information pulled into the AVS if appropriate.         Uli Roman MD   8/10/2023  12:35 EDT

## 2023-11-05 ENCOUNTER — HOSPITAL ENCOUNTER (EMERGENCY)
Facility: HOSPITAL | Age: 68
Discharge: HOME | End: 2023-11-05
Attending: EMERGENCY MEDICINE | Admitting: EMERGENCY MEDICINE
Payer: MEDICARE

## 2023-11-05 ENCOUNTER — APPOINTMENT (OUTPATIENT)
Dept: CT IMAGING | Facility: HOSPITAL | Age: 68
End: 2023-11-05
Payer: MEDICARE

## 2023-11-05 VITALS
SYSTOLIC BLOOD PRESSURE: 176 MMHG | RESPIRATION RATE: 16 BRPM | HEART RATE: 68 BPM | DIASTOLIC BLOOD PRESSURE: 97 MMHG | BODY MASS INDEX: 32.6 KG/M2 | TEMPERATURE: 98.1 F | WEIGHT: 184 LBS | HEIGHT: 63 IN | OXYGEN SATURATION: 94 %

## 2023-11-05 DIAGNOSIS — R30.0 DYSURIA: Primary | ICD-10-CM

## 2023-11-05 DIAGNOSIS — R10.2 PELVIC PAIN: ICD-10-CM

## 2023-11-05 LAB
ALBUMIN SERPL-MCNC: 4.5 G/DL (ref 3.5–5.2)
ALBUMIN/GLOB SERPL: 2 G/DL
ALP SERPL-CCNC: 87 U/L (ref 39–117)
ALT SERPL W P-5'-P-CCNC: 12 U/L (ref 1–33)
ANION GAP SERPL CALCULATED.3IONS-SCNC: 11.2 MMOL/L (ref 5–15)
AST SERPL-CCNC: 15 U/L (ref 1–32)
BACTERIA UR QL AUTO: ABNORMAL /HPF
BASOPHILS # BLD AUTO: 0.06 10*3/MM3 (ref 0–0.2)
BASOPHILS NFR BLD AUTO: 0.9 % (ref 0–1.5)
BILIRUB SERPL-MCNC: 0.2 MG/DL (ref 0–1.2)
BILIRUB UR QL STRIP: ABNORMAL
BUN SERPL-MCNC: 19 MG/DL (ref 8–23)
BUN/CREAT SERPL: 16.1 (ref 7–25)
CALCIUM SPEC-SCNC: 9.1 MG/DL (ref 8.6–10.5)
CHLORIDE SERPL-SCNC: 104 MMOL/L (ref 98–107)
CLARITY UR: CLEAR
CO2 SERPL-SCNC: 23.8 MMOL/L (ref 22–29)
COLOR UR: ABNORMAL
CREAT SERPL-MCNC: 1.18 MG/DL (ref 0.57–1)
DEPRECATED RDW RBC AUTO: 44.6 FL (ref 37–54)
EGFRCR SERPLBLD CKD-EPI 2021: 49.8 ML/MIN/1.73
EOSINOPHIL # BLD AUTO: 0.11 10*3/MM3 (ref 0–0.4)
EOSINOPHIL NFR BLD AUTO: 1.6 % (ref 0.3–6.2)
ERYTHROCYTE [DISTWIDTH] IN BLOOD BY AUTOMATED COUNT: 13.2 % (ref 12.3–15.4)
GLOBULIN UR ELPH-MCNC: 2.2 GM/DL
GLUCOSE SERPL-MCNC: 100 MG/DL (ref 65–99)
GLUCOSE UR STRIP-MCNC: ABNORMAL MG/DL
HCT VFR BLD AUTO: 40.3 % (ref 34–46.6)
HGB BLD-MCNC: 13.3 G/DL (ref 12–15.9)
HGB UR QL STRIP.AUTO: ABNORMAL
HYALINE CASTS UR QL AUTO: ABNORMAL /LPF
IMM GRANULOCYTES # BLD AUTO: 0.01 10*3/MM3 (ref 0–0.05)
IMM GRANULOCYTES NFR BLD AUTO: 0.1 % (ref 0–0.5)
KETONES UR QL STRIP: ABNORMAL
LEUKOCYTE ESTERASE UR QL STRIP.AUTO: ABNORMAL
LYMPHOCYTES # BLD AUTO: 2.74 10*3/MM3 (ref 0.7–3.1)
LYMPHOCYTES NFR BLD AUTO: 41 % (ref 19.6–45.3)
MCH RBC QN AUTO: 30.2 PG (ref 26.6–33)
MCHC RBC AUTO-ENTMCNC: 33 G/DL (ref 31.5–35.7)
MCV RBC AUTO: 91.6 FL (ref 79–97)
MONOCYTES # BLD AUTO: 0.4 10*3/MM3 (ref 0.1–0.9)
MONOCYTES NFR BLD AUTO: 6 % (ref 5–12)
NEUTROPHILS NFR BLD AUTO: 3.37 10*3/MM3 (ref 1.7–7)
NEUTROPHILS NFR BLD AUTO: 50.4 % (ref 42.7–76)
NITRITE UR QL STRIP: ABNORMAL
NRBC BLD AUTO-RTO: 0 /100 WBC (ref 0–0.2)
PH UR STRIP.AUTO: ABNORMAL [PH]
PLATELET # BLD AUTO: 241 10*3/MM3 (ref 140–450)
PMV BLD AUTO: 9.7 FL (ref 6–12)
POTASSIUM SERPL-SCNC: 4.5 MMOL/L (ref 3.5–5.2)
PROT SERPL-MCNC: 6.7 G/DL (ref 6–8.5)
PROT UR QL STRIP: ABNORMAL
RBC # BLD AUTO: 4.4 10*6/MM3 (ref 3.77–5.28)
RBC # UR STRIP: ABNORMAL /HPF
REF LAB TEST METHOD: ABNORMAL
SODIUM SERPL-SCNC: 139 MMOL/L (ref 136–145)
SP GR UR STRIP: 1.02 (ref 1–1.03)
SQUAMOUS #/AREA URNS HPF: ABNORMAL /HPF
UROBILINOGEN UR QL STRIP: ABNORMAL
WBC # UR STRIP: ABNORMAL /HPF
WBC NRBC COR # BLD: 6.69 10*3/MM3 (ref 3.4–10.8)

## 2023-11-05 PROCEDURE — 25510000001 IOPAMIDOL PER 1 ML: Performed by: EMERGENCY MEDICINE

## 2023-11-05 PROCEDURE — 99285 EMERGENCY DEPT VISIT HI MDM: CPT

## 2023-11-05 PROCEDURE — 80053 COMPREHEN METABOLIC PANEL: CPT | Performed by: EMERGENCY MEDICINE

## 2023-11-05 PROCEDURE — 74177 CT ABD & PELVIS W/CONTRAST: CPT

## 2023-11-05 PROCEDURE — 81001 URINALYSIS AUTO W/SCOPE: CPT | Performed by: EMERGENCY MEDICINE

## 2023-11-05 PROCEDURE — 85025 COMPLETE CBC W/AUTO DIFF WBC: CPT | Performed by: EMERGENCY MEDICINE

## 2023-11-05 RX ORDER — CEPHALEXIN 500 MG/1
500 CAPSULE ORAL 3 TIMES DAILY
Qty: 15 CAPSULE | Refills: 0 | Status: SHIPPED | OUTPATIENT
Start: 2023-11-05 | End: 2023-11-10

## 2023-11-05 RX ADMIN — IOPAMIDOL 100 ML: 755 INJECTION, SOLUTION INTRAVENOUS at 19:13

## 2023-11-05 NOTE — ED PROVIDER NOTES
Time: 5:41 PM EST  Date of encounter:  11/5/2023  Independent Historian/Clinical History and Information was obtained by:   Patient    History is limited by: N/A    Chief Complaint: Dysuria      History of Present Illness:  Patient is a 68 y.o. year old female who presents to the emergency department for evaluation of dysuria.  Reports that she has been having pain when she urinates.  Reports that been ongoing for couple weeks now.  States that she was just seen at her doctor about 1 week ago and placed on antibiotics.  States that she has been taking the Bactrim without relief.  States that she is continue to have suprapubic pain as well as dysuria.  Denies fever.  Also reports some chills.    HPI    Patient Care Team  Primary Care Provider: Sandro Noel MD    Past Medical History:     Allergies   Allergen Reactions    Penicillins      Does not know reaction     Past Medical History:   Diagnosis Date    Colitis     OCT 2017    Coronary artery disease     Degenerative disc disease, lumbar     Depression     GERD (gastroesophageal reflux disease)     Hiatal hernia     Hyperlipidemia     Hypertension     Low back pain     Myocardial infarction     PONV (postoperative nausea and vomiting)      Past Surgical History:   Procedure Laterality Date    COLONOSCOPY N/A 1/26/2018    Procedure: COLONOSCOPYto cecum and t.i. with biopsies and polypectomy;  Surgeon: Abraham Miller MD;  Location: [facility] ENDOSCOPY;  Service:     HYSTERECTOMY      NEPHRECTOMY Left      Family History   Problem Relation Age of Onset    Malig Hyperthermia Neg Hx        Home Medications:  Prior to Admission medications    Medication Sig Start Date End Date Taking? Authorizing Provider   aspirin 81 MG chewable tablet Chew 1 tablet Daily.    Provider, MD Eli   carvedilol (COREG) 12.5 MG tablet Take 1 tablet by mouth 2 (Two) Times a Day. 8/10/23   Uli Roman MD   celecoxib (CeleBREX) 100 MG capsule Take 1 capsule by mouth 2 (Two)  "Times a Day As Needed for Mild Pain.    ProviderEli MD   cyclobenzaprine (FLEXERIL) 10 MG tablet Take 1 tablet by mouth 3 (Three) Times a Day As Needed for Muscle Spasms.    Eli Way MD   FLUoxetine (PROzac) 20 MG capsule Take 1 capsule by mouth Daily.    Eli Way MD   furosemide (LASIX) 20 MG tablet Take 1 tablet by mouth Daily. 5/9/23   Naomie Franklin APRN   hydroCHLOROthiazide (HYDRODIURIL) 25 MG tablet Take 1 tablet by mouth Daily. 5/9/23   Naomie Franklin APRN   lisinopril (PRINIVIL,ZESTRIL) 40 MG tablet Take 1 tablet by mouth Daily. 5/9/23   Naomie Franklin APRN   nicotine (Nicoderm CQ) 7 MG/24HR patch Place 1 patch on the skin as directed by provider Daily. 11/29/21   Favian Encarnacion MD   rosuvastatin (CRESTOR) 10 MG tablet Take 1 tablet by mouth Daily. 8/10/23   Uli Roman MD   traMADol (ULTRAM) 50 MG tablet Take 1 tablet by mouth Every 6 (Six) Hours As Needed for Moderate Pain.    ProviderEli MD   varenicline (Chantix Continuing Month John) 1 MG tablet Take 1 tablet by mouth 2 (Two) Times a Day. 8/10/23   lUi Roman MD        Social History:   Social History     Tobacco Use    Smoking status: Every Day     Packs/day: 0.25     Years: 4.00     Additional pack years: 0.00     Total pack years: 1.00     Types: Cigarettes    Smokeless tobacco: Never   Vaping Use    Vaping Use: Never used   Substance Use Topics    Alcohol use: No    Drug use: No         Review of Systems:  Review of Systems   Genitourinary:  Positive for dysuria.        Physical Exam:  /97 (BP Location: Left arm, Patient Position: Sitting)   Pulse 76   Temp 98.1 °F (36.7 °C) (Oral)   Resp 16   Ht 160 cm (63\")   Wt 83.5 kg (184 lb)   SpO2 96%   BMI 32.59 kg/m²     Physical Exam  Vitals and nursing note reviewed.   Constitutional:       Appearance: Normal appearance.   HENT:      Head: Normocephalic and atraumatic.   Eyes:      General: " No scleral icterus.  Cardiovascular:      Rate and Rhythm: Normal rate and regular rhythm.   Pulmonary:      Effort: Pulmonary effort is normal.      Breath sounds: Normal breath sounds.   Abdominal:      Palpations: Abdomen is soft.      Tenderness: There is no abdominal tenderness.   Musculoskeletal:         General: Normal range of motion.      Cervical back: Normal range of motion.   Skin:     Findings: No rash.   Neurological:      General: No focal deficit present.      Mental Status: She is alert.                  Procedures:  Procedures      Medical Decision Making:      Comorbidities that affect care:    Coronary Artery Disease, Hypertension    External Notes reviewed:    Reviewed cardiology note from 8/10/2023      The following orders were placed and all results were independently analyzed by me:  Orders Placed This Encounter   Procedures    CT Abdomen Pelvis With Contrast    Urinalysis With Culture If Indicated - Urine, Clean Catch    Comprehensive Metabolic Panel    CBC Auto Differential    Urinalysis, Microscopic Only - Urine, Clean Catch    CBC & Differential       Medications Given in the Emergency Department:  Medications   iopamidol (ISOVUE-370) 76 % injection 100 mL (100 mL Intravenous Given 11/5/23 1913)        ED Course:    ED Course as of 11/05/23 2017   Sun Nov 05, 2023   2014 Patient reporting that she is just having discomfort within her pelvic region that has been ongoing for couple weeks.  She also reports the pain is chronic constant in nature.  She also states that she is having some dysuria.  Labs and imaging here are unremarkable.  We will change antibiotics since she is on Bactrim to Macrobid.  Will need to follow-up with primary care. [MA]      ED Course User Index  [MA] Clive Cuello MD       Labs:    Lab Results (last 24 hours)       Procedure Component Value Units Date/Time    Urinalysis With Culture If Indicated - Urine, Clean Catch [[id number]]  (Abnormal) Collected:  11/05/23 1727    Specimen: Urine, Clean Catch Updated: 11/05/23 1746     Color, UA Llewellyn     Appearance, UA Clear     pH, UA --     Comment: Result not available due to interfering substances.        Specific Gravity, UA 1.025     Glucose, UA --     Comment: Result not available due to interfering substances.        Ketones, UA --     Comment: Result not available due to interfering substances.        Bilirubin, UA --     Comment: Result not available due to interfering substances.        Blood, UA --     Comment: Result not available due to interfering substances.        Protein, UA --     Comment: Result not available due to interfering substances.        Leuk Esterase, UA --     Comment: Result not available due to interfering substances.        Nitrite, UA --     Comment: Result not available due to interfering substances.        Urobilinogen, UA --     Comment: Result not available due to interfering substances.       Narrative:      In absence of clinical symptoms, the presence of pyuria, bacteria, and/or nitrites on the urinalysis result does not correlate with infection.    Urinalysis, Microscopic Only - Urine, Clean Catch [[id number]]  (Abnormal) Collected: 11/05/23 1727    Specimen: Urine, Clean Catch Updated: 11/05/23 1745     RBC, UA 0-2 /HPF      WBC, UA 3-5 /HPF      Comment: Urine culture not indicated.        Bacteria, UA None Seen /HPF      Squamous Epithelial Cells, UA 3-6 /HPF      Hyaline Casts, UA None Seen /LPF      Methodology Automated Microscopy    CBC & Differential [[id number]]  (Normal) Collected: 11/05/23 1736    Specimen: Blood Updated: 11/05/23 1741    Narrative:      The following orders were created for panel order CBC & Differential.  Procedure                               Abnormality         Status                     ---------                               -----------         ------                     CBC Auto Differential[[id number]]        Normal              Final result                  Please view results for these tests on the individual orders.    Comprehensive Metabolic Panel [[id number]]  (Abnormal) Collected: 11/05/23 1736    Specimen: Blood Updated: 11/05/23 1758     Glucose 100 mg/dL      BUN 19 mg/dL      Creatinine 1.18 mg/dL      Sodium 139 mmol/L      Potassium 4.5 mmol/L      Chloride 104 mmol/L      CO2 23.8 mmol/L      Calcium 9.1 mg/dL      Total Protein 6.7 g/dL      Albumin 4.5 g/dL      ALT (SGPT) 12 U/L      AST (SGOT) 15 U/L      Alkaline Phosphatase 87 U/L      Total Bilirubin 0.2 mg/dL      Globulin 2.2 gm/dL      A/G Ratio 2.0 g/dL      BUN/Creatinine Ratio 16.1     Anion Gap 11.2 mmol/L      eGFR 49.8 mL/min/1.73     Narrative:      GFR Normal >60  Chronic Kidney Disease <60  Kidney Failure <15      CBC Auto Differential [[id number]]  (Normal) Collected: 11/05/23 1736    Specimen: Blood Updated: 11/05/23 1741     WBC 6.69 10*3/mm3      RBC 4.40 10*6/mm3      Hemoglobin 13.3 g/dL      Hematocrit 40.3 %      MCV 91.6 fL      MCH 30.2 pg      MCHC 33.0 g/dL      RDW 13.2 %      RDW-SD 44.6 fl      MPV 9.7 fL      Platelets 241 10*3/mm3      Neutrophil % 50.4 %      Lymphocyte % 41.0 %      Monocyte % 6.0 %      Eosinophil % 1.6 %      Basophil % 0.9 %      Immature Grans % 0.1 %      Neutrophils, Absolute 3.37 10*3/mm3      Lymphocytes, Absolute 2.74 10*3/mm3      Monocytes, Absolute 0.40 10*3/mm3      Eosinophils, Absolute 0.11 10*3/mm3      Basophils, Absolute 0.06 10*3/mm3      Immature Grans, Absolute 0.01 10*3/mm3      nRBC 0.0 /100 WBC              Imaging:    CT Abdomen Pelvis With Contrast    Result Date: 11/5/2023  PROCEDURE: CT ABDOMEN PELVIS W CONTRAST  COMPARISON: 10/17/2020.  INDICATIONS: Abdominal pain, not otherwise specified.  TECHNIQUE: After obtaining the patient's consent, 754 CT images were created with non-ionic intravenous contrast material.  No oral contrast agent was administered for the study.  PROTOCOL:   Standard CT imaging protocol performed.     RADIATION:   Total DLP: 609.9 mGy*cm.   Automated exposure control was utilized to minimize radiation dose. CONTRAST: 80 mL Isovue 370 I.V.  FINDINGS: A small focus of intraluminal gas involves the urinary bladder and may be related to recent catheterization.  No urinary bladder calculi are seen.  The patient has undergone left nephrectomy and hysterectomy.  There may have been prior appendectomy.  Please correlate clinically.  No ureterolithiasis.  No nonobstructing right nephrolithiasis.  There may be tiny right renal cysts.  No acute pyelonephritis on the right by enhanced CT.  There are probably stable hepatic cysts.  A tiny hiatal hernia is possible.  Probably no hepatosplenomegaly.  No gallstones or acute cholecystitis.  No acute pancreatitis.  No adrenal nodule.  No bowel obstruction or bowel wall thickening.  No acute colitis or diverticulitis.  No pneumoperitoneum or pneumatosis.  There may be mild atelectasis and/or fibrosis in the lung bases, greater on the left.  Similar findings were seen previously.  No acute infiltrate is suggested within the partially imaged lung bases.  There are degenerative changes of the imaged spine.  No acute fracture or aggressive osseous lesion is suggested.  Degenerative changes involve the bilateral sacroiliac (SI) joints.  A tiny fat-containing umbilical hernia is seen.  It does not contain bowel.       There is a small focus of intraluminal gas in the urinary bladder.  It may be related to recent catheterization.  Please correlate clinically.  Otherwise, no definite acute findings are suspected.  Please see above comments for further detail.     Please note that portions of this note were completed with a voice recognition program.  DEWAYNE LARA JR, MD       Electronically Signed and Approved By: DEWAYNE LARA JR, MD on 11/05/2023 at 19:37                 Differential Diagnosis and Discussion:    Dysuria: Differential diagnosis includes but is not limited to  urethritis, cystitis, pyelonephritis, ureteral calculi, neoplasm, chemical irritant, urethral stricture, and trauma    All labs were reviewed and interpreted by me.    MDM     Amount and/or Complexity of Data Reviewed  Clinical lab tests: reviewed  Tests in the radiology section of CPT®: reviewed       Patient is a 68-year-old female who presents with complaints of pelvic pain as well as dysuria.  States that she started with having UTI symptoms a couple weeks ago and was placed on antibiotics.  Has been on antibiotics for about a week.  States that she has not really had much improvement.  Reports that there is discomfort within her pelvis as well as dysuria.  Labs and imaging here are unremarkable.  CT shows a possible foci of gas within the bladder.  At this time since she is still not having resolution of symptoms will change antibiotics.  I was unable to view sensitivities as this was done at outside facility.  Recommend that she call her doctor tomorrow for further work-up as needed.  Will DC with strong return precautions.      Patient Care Considerations:          Consultants/Shared Management Plan:    None    Social Determinants of Health:    Patient is independent, reliable, and has access to care.       Disposition and Care Coordination:    Discharged: I considered escalation of care by admitting this patient for observation, however the patient has improved and is suitable and  stable for discharge.    I have explained the patient´s condition, diagnoses and treatment plan based on the information available to me at this time. I have answered questions and addressed any concerns. The patient has a good  understanding of the patient´s diagnosis, condition, and treatment plan as can be expected at this point. The vital signs have been stable. The patient´s condition is stable and appropriate for discharge from the emergency department.      The patient will pursue further outpatient evaluation with the  primary care physician or other designated or consulting physician as outlined in the discharge instructions. They are agreeable to this plan of care and follow-up instructions have been explained in detail. The patient has received these instructions in written format and have expressed an understanding of the discharge instructions. The patient is aware that any significant change in condition or worsening of symptoms should prompt an immediate return to this or the closest emergency department or call to 911.      Final diagnoses:   Dysuria   Pelvic pain        ED Disposition       ED Disposition   Discharge    Condition   Stable    Comment   --               This medical record created using voice recognition software.             Clive Cuello MD  11/05/23 2018

## 2024-03-16 DIAGNOSIS — I25.10 CORONARY ARTERY DISEASE INVOLVING NATIVE HEART WITHOUT ANGINA PECTORIS, UNSPECIFIED VESSEL OR LESION TYPE: ICD-10-CM

## 2024-03-16 DIAGNOSIS — I10 PRIMARY HYPERTENSION: ICD-10-CM

## 2024-03-18 RX ORDER — HYDROCHLOROTHIAZIDE 25 MG/1
TABLET ORAL
Qty: 30 TABLET | Refills: 2 | Status: SHIPPED | OUTPATIENT
Start: 2024-03-18

## 2024-03-19 ENCOUNTER — OFFICE VISIT (OUTPATIENT)
Dept: CARDIOLOGY | Facility: CLINIC | Age: 69
End: 2024-03-19
Payer: MEDICARE

## 2024-03-19 VITALS
SYSTOLIC BLOOD PRESSURE: 152 MMHG | DIASTOLIC BLOOD PRESSURE: 91 MMHG | WEIGHT: 183.8 LBS | BODY MASS INDEX: 32.57 KG/M2 | HEART RATE: 80 BPM | HEIGHT: 63 IN

## 2024-03-19 DIAGNOSIS — Z72.0 TOBACCO ABUSE: ICD-10-CM

## 2024-03-19 DIAGNOSIS — E78.2 MIXED HYPERLIPIDEMIA: Primary | ICD-10-CM

## 2024-03-19 DIAGNOSIS — I25.10 CORONARY ARTERY DISEASE INVOLVING NATIVE HEART WITHOUT ANGINA PECTORIS, UNSPECIFIED VESSEL OR LESION TYPE: ICD-10-CM

## 2024-03-19 DIAGNOSIS — I10 PRIMARY HYPERTENSION: ICD-10-CM

## 2024-03-19 RX ORDER — LISINOPRIL 40 MG/1
40 TABLET ORAL DAILY
Qty: 90 TABLET | Refills: 3 | Status: SHIPPED | OUTPATIENT
Start: 2024-03-19

## 2024-03-19 RX ORDER — ROSUVASTATIN CALCIUM 10 MG/1
10 TABLET, COATED ORAL DAILY
Qty: 90 TABLET | Refills: 2 | Status: SHIPPED | OUTPATIENT
Start: 2024-03-19

## 2024-03-19 NOTE — PROGRESS NOTES
Chief Complaint  Coronary artery disease involving native heart without matt    Subjective        History of Present Illness  Alicia Olson presents to [facility] for follow up.  Patient is a 69-year-old female with past medical history outlined below, significant for remote history of coronary artery disease (left heart cath in 2013 that demonstrated a 50% distal LAD stenosis with just medical management), hypertension, hyperlipidemia who presents for routine follow-up.  She is doing very well from a cardiac standpoint.  She has no real complaints or concerns today.  She denies any chest pain or discomfort, dyspnea, orthopnea, edema, dizziness, lightheadedness, palpitations or syncope.  She is compliant with her medications without adverse effects.      Past Medical History:   Diagnosis Date    Colitis     OCT 2017    Coronary artery disease     Degenerative disc disease, lumbar     Depression     GERD (gastroesophageal reflux disease)     Hiatal hernia     Hyperlipidemia     Hypertension     Low back pain     Myocardial infarction     PONV (postoperative nausea and vomiting)        ALLERGY  Allergies   Allergen Reactions    Penicillins      Does not know reaction        Past Surgical History:   Procedure Laterality Date    COLONOSCOPY N/A 1/26/2018    Procedure: COLONOSCOPYto cecum and t.i. with biopsies and polypectomy;  Surgeon: bAraham Miller MD;  Location: [facility] ENDOSCOPY;  Service:     HYSTERECTOMY      NEPHRECTOMY Left         Social History     Socioeconomic History    Marital status:    Tobacco Use    Smoking status: Every Day     Current packs/day: 0.25     Average packs/day: 0.3 packs/day for 4.0 years (1.0 ttl pk-yrs)     Types: Cigarettes    Smokeless tobacco: Never   Vaping Use    Vaping status: Never Used   Substance and Sexual Activity    Alcohol use: No    Drug use: No    Sexual activity: Defer       Family History   Problem Relation Age of Onset     "Malig Hyperthermia Neg Hx         Current Outpatient Medications on File Prior to Visit   Medication Sig    aspirin 81 MG chewable tablet Chew 1 tablet Daily.    carvedilol (COREG) 12.5 MG tablet Take 1 tablet by mouth 2 (Two) Times a Day.    cyclobenzaprine (FLEXERIL) 10 MG tablet Take 1 tablet by mouth 3 (Three) Times a Day As Needed for Muscle Spasms.    FLUoxetine (PROzac) 20 MG capsule Take 1 capsule by mouth Daily.    furosemide (LASIX) 20 MG tablet Take 1 tablet by mouth Daily.    hydroCHLOROthiazide 25 MG tablet TAKE 1 TABLET BY MOUTH ONCE DAILY FOR FLUID    nicotine (Nicoderm CQ) 7 MG/24HR patch Place 1 patch on the skin as directed by provider Daily.    [DISCONTINUED] lisinopril (PRINIVIL,ZESTRIL) 40 MG tablet Take 1 tablet by mouth Daily.    [DISCONTINUED] rosuvastatin (CRESTOR) 10 MG tablet Take 1 tablet by mouth Daily.    [DISCONTINUED] celecoxib (CeleBREX) 100 MG capsule Take 1 capsule by mouth 2 (Two) Times a Day As Needed for Mild Pain.    [DISCONTINUED] traMADol (ULTRAM) 50 MG tablet Take 1 tablet by mouth Every 6 (Six) Hours As Needed for Moderate Pain.    [DISCONTINUED] varenicline (Chantix Continuing Month John) 1 MG tablet Take 1 tablet by mouth 2 (Two) Times a Day.     No current facility-administered medications on file prior to visit.       Objective   Vitals:    03/19/24 1400 03/19/24 1406   BP: 152/91 152/91   Pulse: 77 80   Weight: 83.4 kg (183 lb 12.8 oz)    Height: 160 cm (62.99\")        Physical Exam  Constitutional:       General: She is awake. She is not in acute distress.     Appearance: Normal appearance.   HENT:      Head: Normocephalic.      Nose: Nose normal. No congestion.   Eyes:      Extraocular Movements: Extraocular movements intact.      Conjunctiva/sclera: Conjunctivae normal.      Pupils: Pupils are equal, round, and reactive to light.   Neck:      Thyroid: No thyromegaly.      Vascular: No JVD.   Cardiovascular:      Rate and Rhythm: Normal rate and regular rhythm.      " Chest Wall: PMI is not displaced.      Pulses: Normal pulses.      Heart sounds: Normal heart sounds, S1 normal and S2 normal. No murmur heard.     No friction rub. No gallop. No S3 or S4 sounds.   Pulmonary:      Effort: Pulmonary effort is normal.      Breath sounds: Normal breath sounds. No wheezing, rhonchi or rales.   Abdominal:      General: Bowel sounds are normal.      Palpations: Abdomen is soft.      Tenderness: There is no abdominal tenderness.   Musculoskeletal:      Cervical back: No tenderness.      Right lower leg: No edema.      Left lower leg: No edema.   Lymphadenopathy:      Cervical: No cervical adenopathy.   Skin:     General: Skin is warm and dry.      Capillary Refill: Capillary refill takes less than 2 seconds.      Coloration: Skin is not cyanotic.      Findings: No petechiae or rash.      Nails: There is no clubbing.   Neurological:      Mental Status: She is alert.   Psychiatric:         Mood and Affect: Mood normal.         Behavior: Behavior is cooperative.           Result Review     The following data was reviewed by ECHO [facility] on 03/19/24.      CMP          5/15/2023    07:52 11/5/2023    17:36   CMP   Glucose 89  100    BUN 14  19    Creatinine 0.91  1.18    EGFR 68.0  49.8    Sodium 143  139    Potassium 3.9  4.5    Chloride 106  104    Calcium 9.0  9.1    Total Protein 5.8  6.7    Albumin 4.1  4.5    Globulin 1.7  2.2    Total Bilirubin 0.3  0.2    Alkaline Phosphatase 72  87    AST (SGOT) 13  15    ALT (SGPT) 7  12    Albumin/Globulin Ratio 2.4  2.0    BUN/Creatinine Ratio 15.4  16.1    Anion Gap 9.3  11.2      CBC w/diff          11/5/2023    17:36   CBC w/Diff   WBC 6.69    RBC 4.40    Hemoglobin 13.3    Hematocrit 40.3    MCV 91.6    MCH 30.2    MCHC 33.0    RDW 13.2    Platelets 241    Neutrophil Rel % 50.4    Immature Granulocyte Rel % 0.1    Lymphocyte Rel % 41.0    Monocyte Rel % 6.0    Eosinophil Rel % 1.6    Basophil Rel % 0.9       Lipid Panel           5/15/2023    07:52   Lipid Panel   Total Cholesterol 233    Triglycerides 124    HDL Cholesterol 55    VLDL Cholesterol 22    LDL Cholesterol  156    LDL/HDL Ratio 2.79        Results for orders placed during the hospital encounter of 05/15/23    Adult Transthoracic Echo Complete w/ Color, Spectral and Contrast if necessary per protocol    Interpretation Summary  Normal left ventricular systolic function.  Ejection fraction around 55%.  No significant valve abnormalities noted.                Procedures    Assessment & Plan  Diagnoses and all orders for this visit:    1. Mixed hyperlipidemia (Primary)  -     Lipid Panel; Future  -     rosuvastatin (CRESTOR) 10 MG tablet; Take 1 tablet by mouth Daily.  Dispense: 90 tablet; Refill: 2    2. Coronary artery disease involving native heart without angina pectoris, unspecified vessel or lesion type  -     Lipid Panel; Future    3. Primary hypertension    4. Tobacco abuse    Other orders  -     lisinopril (PRINIVIL,ZESTRIL) 40 MG tablet; Take 1 tablet by mouth Daily.  Dispense: 90 tablet; Refill: 3      1.  Uncontrolled.  Continue rosuvastatin.  Refill sent.  Will check a lipid panel.  2.Taking medications as instructed without side effects.  No angina or anginal-like symptoms. Continue with current management.  3.  Blood pressure is elevated in the office but she notes normal blood pressure readings at home.  Continue current antihypertensive regimen.  4.  Smoking cessation was advised.    The medical services provided during this encounter are part of ongoing care related to this patient's single serious condition or complex condition.            Follow Up   Return in about 6 months (around 9/19/2024) for With Dr. Roman.    Patient was given instructions and counseling regarding her condition or for health maintenance advice. Please see specific information pulled into the AVS if appropriate.     Naomie Franklin, ECHO  03/19/24  14:23 EDT    Dictated Utilizing Dragon  Dictation

## 2024-05-08 RX ORDER — LISINOPRIL 40 MG/1
40 TABLET ORAL DAILY
Qty: 100 TABLET | Refills: 3 | Status: SHIPPED | OUTPATIENT
Start: 2024-05-08

## 2024-09-25 ENCOUNTER — TELEPHONE (OUTPATIENT)
Dept: CARDIOLOGY | Facility: CLINIC | Age: 69
End: 2024-09-25
Payer: MEDICARE

## 2024-10-03 ENCOUNTER — LAB (OUTPATIENT)
Dept: LAB | Facility: HOSPITAL | Age: 69
End: 2024-10-03
Payer: MEDICARE

## 2024-10-03 DIAGNOSIS — E78.2 MIXED HYPERLIPIDEMIA: ICD-10-CM

## 2024-10-03 DIAGNOSIS — I25.10 CORONARY ARTERY DISEASE INVOLVING NATIVE HEART WITHOUT ANGINA PECTORIS, UNSPECIFIED VESSEL OR LESION TYPE: ICD-10-CM

## 2024-10-03 LAB
CHOLEST SERPL-MCNC: 170 MG/DL (ref 0–200)
HDLC SERPL-MCNC: 59 MG/DL (ref 40–60)
LDLC SERPL CALC-MCNC: 87 MG/DL (ref 0–100)
LDLC/HDLC SERPL: 1.42 {RATIO}
TRIGL SERPL-MCNC: 135 MG/DL (ref 0–150)
VLDLC SERPL-MCNC: 24 MG/DL (ref 5–40)

## 2024-10-03 PROCEDURE — 80061 LIPID PANEL: CPT

## 2024-10-03 PROCEDURE — 36415 COLL VENOUS BLD VENIPUNCTURE: CPT

## 2024-10-04 ENCOUNTER — OFFICE VISIT (OUTPATIENT)
Dept: CARDIOLOGY | Facility: CLINIC | Age: 69
End: 2024-10-04
Payer: MEDICARE

## 2024-10-04 VITALS
WEIGHT: 183 LBS | HEART RATE: 83 BPM | HEIGHT: 62 IN | DIASTOLIC BLOOD PRESSURE: 98 MMHG | BODY MASS INDEX: 33.68 KG/M2 | SYSTOLIC BLOOD PRESSURE: 125 MMHG

## 2024-10-04 DIAGNOSIS — Z72.0 TOBACCO ABUSE: ICD-10-CM

## 2024-10-04 DIAGNOSIS — I10 PRIMARY HYPERTENSION: ICD-10-CM

## 2024-10-04 DIAGNOSIS — I25.10 CORONARY ARTERY DISEASE INVOLVING NATIVE HEART WITHOUT ANGINA PECTORIS, UNSPECIFIED VESSEL OR LESION TYPE: ICD-10-CM

## 2024-10-04 DIAGNOSIS — E78.2 MIXED HYPERLIPIDEMIA: Primary | ICD-10-CM

## 2024-10-04 PROCEDURE — 99214 OFFICE O/P EST MOD 30 MIN: CPT | Performed by: INTERNAL MEDICINE

## 2024-10-04 PROCEDURE — 3074F SYST BP LT 130 MM HG: CPT | Performed by: INTERNAL MEDICINE

## 2024-10-04 PROCEDURE — 3080F DIAST BP >= 90 MM HG: CPT | Performed by: INTERNAL MEDICINE

## 2024-10-04 RX ORDER — HYDROCHLOROTHIAZIDE 25 MG/1
25 TABLET ORAL DAILY
Qty: 30 TABLET | Refills: 2 | Status: SHIPPED | OUTPATIENT
Start: 2024-10-04

## 2024-10-04 RX ORDER — HYDROCODONE BITARTRATE AND ACETAMINOPHEN 5; 325 MG/1; MG/1
TABLET ORAL EVERY 8 HOURS SCHEDULED
COMMUNITY
Start: 2024-07-22

## 2024-10-04 NOTE — PROGRESS NOTES
Chief Complaint  Hyperlipidemia and Coronary Artery Disease (6m Follow Up)    Subjective        Alicia Olson presents to [facility]  History of present illness:    Patient states overall she is doing well.  She notes no exertional chest pain.  She denies any palpitations.  She does note occasional edema and is watching her salt and taking the Lasix as needed.  She is still smoking.      Past Medical History:   Diagnosis Date    Colitis     OCT 2017    Coronary artery disease     Degenerative disc disease, lumbar     Depression     GERD (gastroesophageal reflux disease)     Hiatal hernia     Hyperlipidemia     Hypertension     Low back pain     Myocardial infarction     PONV (postoperative nausea and vomiting)          Past Surgical History:   Procedure Laterality Date    COLONOSCOPY N/A 1/26/2018    Procedure: COLONOSCOPYto cecum and t.i. with biopsies and polypectomy;  Surgeon: Abraham Miller MD;  Location: [facility] ENDOSCOPY;  Service:     HYSTERECTOMY      NEPHRECTOMY Left           Social History     Socioeconomic History    Marital status:    Tobacco Use    Smoking status: Every Day     Current packs/day: 0.25     Average packs/day: 0.3 packs/day for 4.0 years (1.0 ttl pk-yrs)     Types: Cigarettes    Smokeless tobacco: Never   Vaping Use    Vaping status: Never Used   Substance and Sexual Activity    Alcohol use: No    Drug use: No    Sexual activity: Defer         Family History   Problem Relation Age of Onset    Malig Hyperthermia Neg Hx           Allergies   Allergen Reactions    Penicillins      Does not know reaction            Current Outpatient Medications:     aspirin 81 MG chewable tablet, Chew 1 tablet Daily., Disp: , Rfl:     carvedilol (COREG) 12.5 MG tablet, Take 1 tablet by mouth 2 (Two) Times a Day., Disp: 180 tablet, Rfl: 3    cyclobenzaprine (FLEXERIL) 10 MG tablet, Take 1 tablet by mouth 3 (Three) Times a Day As Needed for Muscle Spasms., Disp: ,  "Rfl:     FLUoxetine (PROzac) 20 MG capsule, Take 1 capsule by mouth Daily., Disp: , Rfl:     hydroCHLOROthiazide 25 MG tablet, Take 1 tablet by mouth Daily., Disp: 30 tablet, Rfl: 2    HYDROcodone-acetaminophen (NORCO) 5-325 MG per tablet, Every 8 (Eight) Hours., Disp: , Rfl:     lisinopril (PRINIVIL,ZESTRIL) 40 MG tablet, Take 1 tablet by mouth Daily., Disp: 100 tablet, Rfl: 3    rosuvastatin (CRESTOR) 10 MG tablet, Take 1 tablet by mouth Daily., Disp: 90 tablet, Rfl: 2    furosemide (LASIX) 20 MG tablet, Take 1 tablet by mouth Daily. (Patient not taking: Reported on 10/4/2024), Disp: 30 tablet, Rfl: 0    nicotine (Nicoderm CQ) 7 MG/24HR patch, Place 1 patch on the skin as directed by provider Daily. (Patient not taking: Reported on 10/4/2024), Disp: 30 each, Rfl: 1      ROS:  Cardiac review of systems is positive for occasional edema.    Objective     /98   Pulse 83   Ht 157.5 cm (62\")   Wt 83 kg (183 lb)   BMI 33.47 kg/m²       General Appearance:   well developed  well nourished  HENT:   oropharynx moist  lips not cyanotic  Respiratory:  no respiratory distress  normal breath sounds  no rales  Cardiovascular:  no jugular venous distention  regular rhythm  S1 normal, S2 normal  no S3, no S4   no murmur  no rub, no thrill  No carotid bruit  pedal pulses normal  lower extremity edema: none    Musculoskeletal:  no clubbing of fingers.   normocephalic, head atraumatic  Skin:   warm, dry  Psychiatric:  judgement and insight appropriate  normal mood and affect    ECHO:  Results for orders placed during the hospital encounter of 05/15/23    Adult Transthoracic Echo Complete w/ Color, Spectral and Contrast if necessary per protocol    Interpretation Summary  Normal left ventricular systolic function.  Ejection fraction around 55%.  No significant valve abnormalities noted.    STRESS:    CATH:  No results found for this or any previous visit.    BMP:     Glucose   Date Value Ref Range Status   11/05/2023 100 (H) " 65 - 99 mg/dL Final     BUN   Date Value Ref Range Status   11/05/2023 19 8 - 23 mg/dL Final     Creatinine   Date Value Ref Range Status   11/05/2023 1.18 (H) 0.57 - 1.00 mg/dL Final     Sodium   Date Value Ref Range Status   11/05/2023 139 136 - 145 mmol/L Final     Potassium   Date Value Ref Range Status   11/05/2023 4.5 3.5 - 5.2 mmol/L Final     Chloride   Date Value Ref Range Status   11/05/2023 104 98 - 107 mmol/L Final     CO2   Date Value Ref Range Status   11/05/2023 23.8 22.0 - 29.0 mmol/L Final     Calcium   Date Value Ref Range Status   11/05/2023 9.1 8.6 - 10.5 mg/dL Final     BUN/Creatinine Ratio   Date Value Ref Range Status   11/05/2023 16.1 7.0 - 25.0 Final     Anion Gap   Date Value Ref Range Status   11/05/2023 11.2 5.0 - 15.0 mmol/L Final     eGFR   Date Value Ref Range Status   11/05/2023 49.8 (L) >60.0 mL/min/1.73 Final     LIPIDS:  Total Cholesterol   Date Value Ref Range Status   10/03/2024 170 0 - 200 mg/dL Final     Triglycerides   Date Value Ref Range Status   10/03/2024 135 0 - 150 mg/dL Final     HDL Cholesterol   Date Value Ref Range Status   10/03/2024 59 40 - 60 mg/dL Final     LDL Cholesterol    Date Value Ref Range Status   10/03/2024 87 0 - 100 mg/dL Final     VLDL Cholesterol   Date Value Ref Range Status   10/03/2024 24 5 - 40 mg/dL Final     LDL/HDL Ratio   Date Value Ref Range Status   10/03/2024 1.42  Final         Procedures             ASSESSMENT:  Diagnoses and all orders for this visit:    1. Mixed hyperlipidemia (Primary)    2. Primary hypertension  -     hydroCHLOROthiazide 25 MG tablet; Take 1 tablet by mouth Daily.  Dispense: 30 tablet; Refill: 2    3. Coronary artery disease involving native heart without angina pectoris, unspecified vessel or lesion type  -     hydroCHLOROthiazide 25 MG tablet; Take 1 tablet by mouth Daily.  Dispense: 30 tablet; Refill: 2    4. Tobacco abuse         PLAN:    1.  Patient's systolic blood pressure is good.  She does run slightly  elevated on the diastolic component.  She states it normally runs in the 80s to 90s.  We are just going to continue to monitor.  If needed we could try going up on her Coreg versus adding low-dose amlodipine.  2.  Patient had echocardiogram 5/15/2023 that showed normal ejection fraction and no significant valvular disease.  3.  Patient had a non-STEMI back in 2013 with only mild to moderate coronary artery disease with a 50% distal LAD stenosis.  4.  Agree with the Crestor.  Patient cholesterol checked 10/3/2024 with LDL 87, HDL 59, and triglycerides 135.  In the future we may consider trying to increase this dose.  5.  Continue the aspirin.  The patient notes no problems with bleeding.  6.  Encourage smoking cessation.  The patient states that the nicotine patches help and we will refill them.  If this cannot get her tobacco free at that time we may consider Chantix.      Return in about 6 months (around 4/4/2025).     Patient was given instructions and counseling regarding her condition or for health maintenance advice. Please see specific information pulled into the AVS if appropriate.         Uli Roman MD   10/4/2024  14:51 EDT

## 2024-12-02 ENCOUNTER — TELEPHONE (OUTPATIENT)
Dept: CARDIOLOGY | Facility: CLINIC | Age: 69
End: 2024-12-02
Payer: MEDICARE

## 2024-12-02 NOTE — TELEPHONE ENCOUNTER
[facility] received a fax that requires your attention. The document has been indexed to the patient’s chart for your review.      Reason for sending: EXTERNAL MEDICAL RECORD NOTIFICATION     Documents Description: LISINOPRIL NON [id number]     Name of Sender: AALIYAH     Date Indexed: 11.25.24

## 2025-02-05 ENCOUNTER — TELEPHONE (OUTPATIENT)
Dept: CARDIOLOGY | Facility: CLINIC | Age: 70
End: 2025-02-05
Payer: MEDICARE

## 2025-02-05 NOTE — TELEPHONE ENCOUNTER
DELETE AFTER REVIEWING: Send the encounter HIGH priority, If patient has less than a 3 day supply. If the patient will run out of medication over the weekend add that information to the additional details line. Send to the appropriate pool. Check your call action grid or workflows.    Caller: Alicia Olson    Relationship: Self    Best call back number:   Telephone Information:   Mobile [phone]       Requested Prescriptions:   NITROGLYCERINE        Pharmacy where request should be sent:  [facility]    Last office visit with prescribing clinician: 10/4/2024   Last telemedicine visit with prescribing clinician: Visit date not found   Next office visit with prescribing clinician: Visit date not found     Additional details provided by patient: OUT OF MEDICATION    Does the patient have less than a 3 day supply:  [x] Yes  [] No    Would you like a call back once the refill request has been completed: [] Yes [] No    If the office needs to give you a call back, can they leave a voicemail: [] Yes [] No    Juni Thornton Rep   02/05/25 12:47 EST

## 2025-02-06 RX ORDER — NITROGLYCERIN 0.4 MG/1
TABLET SUBLINGUAL
Qty: 100 TABLET | Refills: 11 | Status: SHIPPED | OUTPATIENT
Start: 2025-02-06

## 2025-02-06 NOTE — TELEPHONE ENCOUNTER
Medication is not on med list. Please advise.   Carac Counseling:  I discussed with the patient the risks of Carac including but not limited to erythema, scaling, itching, weeping, crusting, and pain.

## 2025-03-12 DIAGNOSIS — I10 PRIMARY HYPERTENSION: ICD-10-CM

## 2025-03-12 DIAGNOSIS — I25.10 CORONARY ARTERY DISEASE INVOLVING NATIVE HEART WITHOUT ANGINA PECTORIS, UNSPECIFIED VESSEL OR LESION TYPE: ICD-10-CM

## 2025-03-13 RX ORDER — HYDROCHLOROTHIAZIDE 25 MG/1
TABLET ORAL
Qty: 30 TABLET | Refills: 1 | Status: SHIPPED | OUTPATIENT
Start: 2025-03-13

## 2025-04-23 RX ORDER — LISINOPRIL 40 MG/1
40 TABLET ORAL DAILY
Qty: 90 TABLET | Refills: 2 | Status: SHIPPED | OUTPATIENT
Start: 2025-04-23

## 2025-04-24 ENCOUNTER — OFFICE VISIT (OUTPATIENT)
Dept: CARDIOLOGY | Facility: CLINIC | Age: 70
End: 2025-04-24
Payer: MEDICARE

## 2025-04-24 VITALS
SYSTOLIC BLOOD PRESSURE: 141 MMHG | WEIGHT: 182 LBS | HEIGHT: 62 IN | DIASTOLIC BLOOD PRESSURE: 89 MMHG | HEART RATE: 80 BPM | BODY MASS INDEX: 33.49 KG/M2

## 2025-04-24 DIAGNOSIS — Z72.0 TOBACCO ABUSE: ICD-10-CM

## 2025-04-24 DIAGNOSIS — I10 PRIMARY HYPERTENSION: ICD-10-CM

## 2025-04-24 DIAGNOSIS — E78.2 MIXED HYPERLIPIDEMIA: ICD-10-CM

## 2025-04-24 DIAGNOSIS — I25.10 CORONARY ARTERY DISEASE INVOLVING NATIVE HEART WITHOUT ANGINA PECTORIS, UNSPECIFIED VESSEL OR LESION TYPE: Primary | ICD-10-CM

## 2025-04-24 RX ORDER — FUROSEMIDE 20 MG/1
20 TABLET ORAL DAILY
Qty: 30 TABLET | Refills: 3 | Status: SHIPPED | OUTPATIENT
Start: 2025-04-24

## 2025-04-24 NOTE — ASSESSMENT & PLAN NOTE
Blood pressure is mildly elevated in the office today but she reports good blood pressure control at home.  Continue current antihypertensive regimen.  She will notify us if her blood pressure is consistently elevated.

## 2025-04-24 NOTE — ASSESSMENT & PLAN NOTE
Clinically stable with no angina or anginal-like symptoms.  Continue baby aspirin and beta-blocker.

## 2025-04-24 NOTE — PROGRESS NOTES
Chief Complaint  6 month follow up , Coronary Artery Disease, Hypertension, and Hyperlipidemia    Subjective        History of Present Illness  Alicia Olson presents to [facility] for follow up.   Patient is a 70-year-old female with past medical history significant for coronary artery disease status post previous non-STEMI in 2013 with mild to moderate coronary artery disease with a 50% distal LAD stenosis, hypertension, hyperlipidemia and current smoking who presents for routine follow-up.  She is doing very well from a cardiac standpoint.  She has no chest pain or discomfort, dyspnea, palpitations, edema or syncope.  She is using nicotine patches to cut back on smoking.    Past Medical History:   Diagnosis Date    Colitis     OCT 2017    Coronary artery disease     Degenerative disc disease, lumbar     Depression     GERD (gastroesophageal reflux disease)     Hiatal hernia     Hyperlipidemia     Hypertension     Low back pain     Myocardial infarction     PONV (postoperative nausea and vomiting)        ALLERGY  Allergies   Allergen Reactions    Penicillins      Does not know reaction        Past Surgical History:   Procedure Laterality Date    COLONOSCOPY N/A 1/26/2018    Procedure: COLONOSCOPYto cecum and t.i. with biopsies and polypectomy;  Surgeon: Abraham Miller MD;  Location: [facility] ENDOSCOPY;  Service:     HYSTERECTOMY      NEPHRECTOMY Left         Social History     Socioeconomic History    Marital status:    Tobacco Use    Smoking status: Every Day     Current packs/day: 0.25     Average packs/day: 0.3 packs/day for 4.0 years (1.0 ttl pk-yrs)     Types: Cigarettes    Smokeless tobacco: Never   Vaping Use    Vaping status: Never Used   Substance and Sexual Activity    Alcohol use: No    Drug use: No    Sexual activity: Defer       Family History   Problem Relation Age of Onset    Malig Hyperthermia Neg Hx         Current Outpatient Medications on File Prior  "to Visit   Medication Sig    aspirin 81 MG chewable tablet Chew 1 tablet Daily.    carvedilol (COREG) 12.5 MG tablet Take 1 tablet by mouth 2 (Two) Times a Day.    cyclobenzaprine (FLEXERIL) 10 MG tablet Take 1 tablet by mouth 3 (Three) Times a Day As Needed for Muscle Spasms.    FLUoxetine (PROzac) 20 MG capsule Take 1 capsule by mouth Daily.    hydroCHLOROthiazide 25 MG tablet TAKE 1 TABLET BY MOUTH DAILY FOR BLOOD PRESSURE OR FLUID    HYDROcodone-acetaminophen (NORCO) 5-325 MG per tablet Every 8 (Eight) Hours.    lisinopril (PRINIVIL,ZESTRIL) 40 MG tablet TAKE 1 TABLET BY MOUTH ONCE DAILY FOR BLOOD PRESSURE    nicotine (Nicoderm CQ) 7 MG/24HR patch Place 1 patch on the skin as directed by provider Daily.    nitroglycerin (NITROSTAT) 0.4 MG SL tablet 1 under the tongue as needed for angina, may repeat q5mins for up three doses    rosuvastatin (CRESTOR) 10 MG tablet Take 1 tablet by mouth Daily.    [DISCONTINUED] furosemide (LASIX) 20 MG tablet Take 1 tablet by mouth Daily.     No current facility-administered medications on file prior to visit.       Objective   Vitals:    04/24/25 1404 04/24/25 1406   BP: 158/97 141/89   Pulse: 83 80   Weight: 82.6 kg (182 lb)    Height: 157.5 cm (62\")        Physical Exam  Constitutional:       General: She is awake. She is not in acute distress.     Appearance: Normal appearance.   HENT:      Head: Normocephalic.      Nose: Nose normal. No congestion.   Eyes:      Extraocular Movements: Extraocular movements intact.      Conjunctiva/sclera: Conjunctivae normal.      Pupils: Pupils are equal, round, and reactive to light.   Neck:      Thyroid: No thyromegaly.      Vascular: No JVD.   Cardiovascular:      Rate and Rhythm: Normal rate and regular rhythm.      Chest Wall: PMI is not displaced.      Pulses: Normal pulses.      Heart sounds: Normal heart sounds, S1 normal and S2 normal. No murmur heard.     No friction rub. No gallop. No S3 or S4 sounds.   Pulmonary:      Effort: " Pulmonary effort is normal.      Breath sounds: Normal breath sounds. No wheezing, rhonchi or rales.   Abdominal:      General: Bowel sounds are normal.      Palpations: Abdomen is soft.      Tenderness: There is no abdominal tenderness.   Musculoskeletal:      Cervical back: No tenderness.      Right lower leg: No edema.      Left lower leg: No edema.   Lymphadenopathy:      Cervical: No cervical adenopathy.   Skin:     General: Skin is warm and dry.      Capillary Refill: Capillary refill takes less than 2 seconds.      Coloration: Skin is not cyanotic.      Findings: No petechiae or rash.      Nails: There is no clubbing.   Neurological:      Mental Status: She is alert.   Psychiatric:         Mood and Affect: Mood normal.         Behavior: Behavior is cooperative.           Result Review     The following data was reviewed by ECHO [facility] on 04/24/25.           Lipid Panel          10/3/2024    14:47   Lipid Panel   Total Cholesterol 170    Triglycerides 135    HDL Cholesterol 59    VLDL Cholesterol 24    LDL Cholesterol  87    LDL/HDL Ratio 1.42        Results for orders placed during the hospital encounter of 05/15/23    Adult Transthoracic Echo Complete w/ Color, Spectral and Contrast if necessary per protocol    Interpretation Summary  Normal left ventricular systolic function.  Ejection fraction around 55%.  No significant valve abnormalities noted.      No results found for this or any previous visit.          Procedures      Assessment & Plan  Coronary artery disease involving native heart without angina pectoris, unspecified vessel or lesion type  Clinically stable with no angina or anginal-like symptoms.  Continue baby aspirin and beta-blocker.  Primary hypertension  Blood pressure is mildly elevated in the office today but she reports good blood pressure control at home.  Continue current antihypertensive regimen.  She will notify us if her blood pressure is consistently elevated.  Mixed  hyperlipidemia  Continue statin therapy.  LDL is fair.  Tobacco abuse  Patient is using nicotine patches to work towards tobacco cessation.  She admits to smoking almost a pack a day.  Complete cessation was encouraged.                     Follow Up   Return in about 6 months (around 10/24/2025) for With Dr. Roman.    Patient was given instructions and counseling regarding her condition or for health maintenance advice. Please see specific information pulled into the AVS if appropriate.     Naomie Franklin, ECHO  04/24/25  14:18 EDT    Dictated Utilizing Dragon Dictation

## 2025-06-16 DIAGNOSIS — I25.10 CORONARY ARTERY DISEASE INVOLVING NATIVE HEART WITHOUT ANGINA PECTORIS, UNSPECIFIED VESSEL OR LESION TYPE: ICD-10-CM

## 2025-06-16 DIAGNOSIS — I10 PRIMARY HYPERTENSION: ICD-10-CM

## 2025-06-16 RX ORDER — HYDROCHLOROTHIAZIDE 25 MG/1
TABLET ORAL
Qty: 30 TABLET | Refills: 3 | Status: SHIPPED | OUTPATIENT
Start: 2025-06-16

## 2025-07-26 DIAGNOSIS — E78.2 MIXED HYPERLIPIDEMIA: ICD-10-CM

## 2025-07-28 RX ORDER — ROSUVASTATIN CALCIUM 10 MG/1
10 TABLET, COATED ORAL EVERY EVENING
Qty: 90 TABLET | Refills: 1 | Status: SHIPPED | OUTPATIENT
Start: 2025-07-28

## (undated) DEVICE — THE TORRENT IRRIGATION SCOPE CONNECTOR IS USED WITH THE TORRENT IRRIGATION TUBING TO PROVIDE IRRIGATION FLUIDS SUCH AS STERILE WATER DURING GASTROINTESTINAL ENDOSCOPIC PROCEDURES WHEN USED IN CONJUNCTION WITH AN IRRIGATION PUMP (OR ELECTROSURGICAL UNIT).: Brand: TORRENT

## (undated) DEVICE — TUBING,OXYGEN,CRUSH RES,14',CLEAR,SC: Brand: MEDLINE

## (undated) DEVICE — Device: Brand: DEFENDO AIR/WATER/SUCTION AND BIOPSY VALVE

## (undated) DEVICE — CANN NASL CO2 TRULINK W/O2 A/

## (undated) DEVICE — SINGLE-USE BIOPSY FORCEPS: Brand: RADIAL JAW 4

## (undated) DEVICE — TUBING, SUCTION, 1/4" X 10', STRAIGHT: Brand: MEDLINE